# Patient Record
Sex: FEMALE | Race: BLACK OR AFRICAN AMERICAN | NOT HISPANIC OR LATINO | URBAN - METROPOLITAN AREA
[De-identification: names, ages, dates, MRNs, and addresses within clinical notes are randomized per-mention and may not be internally consistent; named-entity substitution may affect disease eponyms.]

---

## 2021-06-10 ENCOUNTER — EMERGENCY (EMERGENCY)
Facility: HOSPITAL | Age: 61
LOS: 0 days | Discharge: HOME | End: 2021-06-11
Attending: EMERGENCY MEDICINE | Admitting: EMERGENCY MEDICINE
Payer: COMMERCIAL

## 2021-06-10 VITALS
WEIGHT: 139.99 LBS | OXYGEN SATURATION: 99 % | SYSTOLIC BLOOD PRESSURE: 257 MMHG | DIASTOLIC BLOOD PRESSURE: 120 MMHG | HEART RATE: 113 BPM | RESPIRATION RATE: 17 BRPM | TEMPERATURE: 99 F

## 2021-06-10 DIAGNOSIS — Z79.82 LONG TERM (CURRENT) USE OF ASPIRIN: ICD-10-CM

## 2021-06-10 DIAGNOSIS — R07.89 OTHER CHEST PAIN: ICD-10-CM

## 2021-06-10 DIAGNOSIS — I10 ESSENTIAL (PRIMARY) HYPERTENSION: ICD-10-CM

## 2021-06-10 DIAGNOSIS — R07.9 CHEST PAIN, UNSPECIFIED: ICD-10-CM

## 2021-06-10 LAB
ALBUMIN SERPL ELPH-MCNC: 5.2 G/DL — SIGNIFICANT CHANGE UP (ref 3.5–5.2)
ALP SERPL-CCNC: 104 U/L — SIGNIFICANT CHANGE UP (ref 30–115)
ALT FLD-CCNC: 20 U/L — SIGNIFICANT CHANGE UP (ref 0–41)
ANION GAP SERPL CALC-SCNC: 10 MMOL/L — SIGNIFICANT CHANGE UP (ref 7–14)
AST SERPL-CCNC: 26 U/L — SIGNIFICANT CHANGE UP (ref 0–41)
BASOPHILS # BLD AUTO: 0.04 K/UL — SIGNIFICANT CHANGE UP (ref 0–0.2)
BASOPHILS NFR BLD AUTO: 0.6 % — SIGNIFICANT CHANGE UP (ref 0–1)
BILIRUB SERPL-MCNC: 0.5 MG/DL — SIGNIFICANT CHANGE UP (ref 0.2–1.2)
BUN SERPL-MCNC: 8 MG/DL — LOW (ref 10–20)
CALCIUM SERPL-MCNC: 10 MG/DL — SIGNIFICANT CHANGE UP (ref 8.5–10.1)
CHLORIDE SERPL-SCNC: 100 MMOL/L — SIGNIFICANT CHANGE UP (ref 98–110)
CO2 SERPL-SCNC: 29 MMOL/L — SIGNIFICANT CHANGE UP (ref 17–32)
CREAT SERPL-MCNC: 0.8 MG/DL — SIGNIFICANT CHANGE UP (ref 0.7–1.5)
EOSINOPHIL # BLD AUTO: 0.11 K/UL — SIGNIFICANT CHANGE UP (ref 0–0.7)
EOSINOPHIL NFR BLD AUTO: 1.6 % — SIGNIFICANT CHANGE UP (ref 0–8)
GLUCOSE SERPL-MCNC: 88 MG/DL — SIGNIFICANT CHANGE UP (ref 70–99)
HCT VFR BLD CALC: 39.9 % — SIGNIFICANT CHANGE UP (ref 37–47)
HGB BLD-MCNC: 12.6 G/DL — SIGNIFICANT CHANGE UP (ref 12–16)
IMM GRANULOCYTES NFR BLD AUTO: 0.3 % — SIGNIFICANT CHANGE UP (ref 0.1–0.3)
LYMPHOCYTES # BLD AUTO: 1.71 K/UL — SIGNIFICANT CHANGE UP (ref 1.2–3.4)
LYMPHOCYTES # BLD AUTO: 25.3 % — SIGNIFICANT CHANGE UP (ref 20.5–51.1)
MCHC RBC-ENTMCNC: 26.8 PG — LOW (ref 27–31)
MCHC RBC-ENTMCNC: 31.6 G/DL — LOW (ref 32–37)
MCV RBC AUTO: 84.9 FL — SIGNIFICANT CHANGE UP (ref 81–99)
MONOCYTES # BLD AUTO: 0.57 K/UL — SIGNIFICANT CHANGE UP (ref 0.1–0.6)
MONOCYTES NFR BLD AUTO: 8.4 % — SIGNIFICANT CHANGE UP (ref 1.7–9.3)
NEUTROPHILS # BLD AUTO: 4.31 K/UL — SIGNIFICANT CHANGE UP (ref 1.4–6.5)
NEUTROPHILS NFR BLD AUTO: 63.8 % — SIGNIFICANT CHANGE UP (ref 42.2–75.2)
NRBC # BLD: 0 /100 WBCS — SIGNIFICANT CHANGE UP (ref 0–0)
PLATELET # BLD AUTO: 321 K/UL — SIGNIFICANT CHANGE UP (ref 130–400)
POTASSIUM SERPL-MCNC: 4 MMOL/L — SIGNIFICANT CHANGE UP (ref 3.5–5)
POTASSIUM SERPL-SCNC: 4 MMOL/L — SIGNIFICANT CHANGE UP (ref 3.5–5)
PROT SERPL-MCNC: 8.2 G/DL — HIGH (ref 6–8)
RBC # BLD: 4.7 M/UL — SIGNIFICANT CHANGE UP (ref 4.2–5.4)
RBC # FLD: 12.1 % — SIGNIFICANT CHANGE UP (ref 11.5–14.5)
SARS-COV-2 RNA SPEC QL NAA+PROBE: SIGNIFICANT CHANGE UP
SODIUM SERPL-SCNC: 139 MMOL/L — SIGNIFICANT CHANGE UP (ref 135–146)
TROPONIN T SERPL-MCNC: <0.01 NG/ML — SIGNIFICANT CHANGE UP
TROPONIN T SERPL-MCNC: <0.01 NG/ML — SIGNIFICANT CHANGE UP
WBC # BLD: 6.76 K/UL — SIGNIFICANT CHANGE UP (ref 4.8–10.8)
WBC # FLD AUTO: 6.76 K/UL — SIGNIFICANT CHANGE UP (ref 4.8–10.8)

## 2021-06-10 PROCEDURE — 71045 X-RAY EXAM CHEST 1 VIEW: CPT | Mod: 26

## 2021-06-10 PROCEDURE — 93010 ELECTROCARDIOGRAM REPORT: CPT | Mod: 77

## 2021-06-10 PROCEDURE — 93010 ELECTROCARDIOGRAM REPORT: CPT

## 2021-06-10 PROCEDURE — 99220: CPT

## 2021-06-10 RX ORDER — LABETALOL HCL 100 MG
20 TABLET ORAL ONCE
Refills: 0 | Status: COMPLETED | OUTPATIENT
Start: 2021-06-10 | End: 2021-06-10

## 2021-06-10 RX ORDER — METOPROLOL TARTRATE 50 MG
100 TABLET ORAL ONCE
Refills: 0 | Status: COMPLETED | OUTPATIENT
Start: 2021-06-10 | End: 2021-06-10

## 2021-06-10 RX ORDER — LABETALOL HCL 100 MG
10 TABLET ORAL ONCE
Refills: 0 | Status: COMPLETED | OUTPATIENT
Start: 2021-06-10 | End: 2021-06-10

## 2021-06-10 RX ORDER — ASPIRIN/CALCIUM CARB/MAGNESIUM 324 MG
324 TABLET ORAL DAILY
Refills: 0 | Status: DISCONTINUED | OUTPATIENT
Start: 2021-06-10 | End: 2021-06-11

## 2021-06-10 RX ADMIN — Medication 100 MILLIGRAM(S): at 17:40

## 2021-06-10 RX ADMIN — Medication 324 MILLIGRAM(S): at 16:02

## 2021-06-10 RX ADMIN — Medication 10 MILLIGRAM(S): at 18:45

## 2021-06-10 RX ADMIN — Medication 20 MILLIGRAM(S): at 20:00

## 2021-06-10 NOTE — ED CDU PROVIDER INITIAL DAY NOTE - ATTENDING CONTRIBUTION TO CARE
59yo woman no significant PMH, but does not routinely follow with a physician, was placed in CDU for workup of chest discomfort without associated sx. In the ED she was hypertensive but asymptomatic; workup was unremarkable and she had some improvement in BP after meds. Exam as noted, pt very well appearing and comfortable on my eval. Plan is for BP control, telemetry, serial EKG/enzymes, CCTA.

## 2021-06-10 NOTE — ED CDU PROVIDER INITIAL DAY NOTE - PROGRESS NOTE DETAILS
received singout from Sukhwinder Dickerson - pt in obs for evaluation of chest pain; - initial ekg/ce wnl; pt with uncontrolled bp; was on bp meds years ago but has not seen Md in 10 yrs and not taken any bp meds; pt currently assymptomatic; - repeat bp still elevated - pt given labetalol 20mg Iv - will repeat bp and continue to monitor; 18 guage iv inserted with US and repeat trop sent;

## 2021-06-10 NOTE — ED PROVIDER NOTE - OBJECTIVE STATEMENT
60 y.o. female without any PMH presented to the ER c/o constant, non-radiating, worsening midsternal chest pressure which started 1PM yesterday after a meal and persisted until she went to sleep last night.  Pt woke up pain free.  Pt denies fever, chills, cough, SOB, nausea, vomiting, diaphoresis, palpitations, recent travel, smoking, drug use, family h/o early heart disease/sudden cardiac death.  (+) weakness last night.  Asymptomatic at present.  Pt hasn't seen a DrMilton in 10 years.

## 2021-06-10 NOTE — ED CDU PROVIDER INITIAL DAY NOTE - OBJECTIVE STATEMENT
61 y/o F, no significant PMHx, presents to the ED with complaints of chest discomfort yesterday. Patient admits to a pressure-like sensation that lasted several hours yesterday and resolved last night. She denies accompanying dyspnea, back pain, abdominal pain, nausea, vomiting, diaphoresis, lower extremity swelling and any recent travel/immobilization. She states that she has not seen a physician in 10+ years. She states that she was previously on an anti-hypertensive medication at that time. She is not a smoker; denies FHx of CAD.

## 2021-06-10 NOTE — ED PROVIDER NOTE - CLINICAL SUMMARY MEDICAL DECISION MAKING FREE TEXT BOX
EKG nonischemic, CXR and labs ok. BP still elevated; will place in CDU for continued workup and management.

## 2021-06-10 NOTE — ED ADULT TRIAGE NOTE - CHIEF COMPLAINT QUOTE
chest pain x 3 days with no radiating pain, no use of pain medications, hypertensive at triage, used to take BP meds but does not take it no more

## 2021-06-10 NOTE — ED CDU PROVIDER INITIAL DAY NOTE - MEDICAL DECISION MAKING DETAILS
61yo woman no significant PMH, but does not routinely follow with a physician, was placed in CDU for workup of chest discomfort without associated sx. In the ED she was hypertensive but asymptomatic; workup was unremarkable and she had some improvement in BP after meds. Exam as noted, pt very well appearing and comfortable on my eval. Plan is for BP control, telemetry, serial EKG/enzymes, CCTA.

## 2021-06-10 NOTE — ED PROVIDER NOTE - ATTENDING CONTRIBUTION TO CARE
61yo woman no significant PMH c/o chest pain, pressurelike, constant x 2 days. Began after eating. No associated SOB, sweating, nausea, no exertional component. On exam pt is very hypertensive (she says she has not seen a physician in 10 years) but asymptomatic, nontoxic appearing. Lungs CTA, CVS1S2 RRR abd soft, no peripheral edema. Plan is for EKG, labs, CXR, BP control and likely obs for cardiac workup.

## 2021-06-11 VITALS
HEART RATE: 68 BPM | SYSTOLIC BLOOD PRESSURE: 187 MMHG | RESPIRATION RATE: 18 BRPM | TEMPERATURE: 97 F | DIASTOLIC BLOOD PRESSURE: 84 MMHG | OXYGEN SATURATION: 100 %

## 2021-06-11 PROCEDURE — 75574 CT ANGIO HRT W/3D IMAGE: CPT | Mod: 26,MA

## 2021-06-11 PROCEDURE — 99217: CPT

## 2021-06-11 RX ORDER — METOPROLOL TARTRATE 50 MG
100 TABLET ORAL ONCE
Refills: 0 | Status: COMPLETED | OUTPATIENT
Start: 2021-06-11 | End: 2021-06-11

## 2021-06-11 RX ORDER — LOSARTAN/HYDROCHLOROTHIAZIDE 100MG-25MG
1 TABLET ORAL
Qty: 30 | Refills: 0
Start: 2021-06-11 | End: 2021-07-10

## 2021-06-11 RX ORDER — AMLODIPINE BESYLATE 2.5 MG/1
10 TABLET ORAL ONCE
Refills: 0 | Status: COMPLETED | OUTPATIENT
Start: 2021-06-11 | End: 2021-06-11

## 2021-06-11 RX ORDER — LOSARTAN POTASSIUM 100 MG/1
100 TABLET, FILM COATED ORAL ONCE
Refills: 0 | Status: COMPLETED | OUTPATIENT
Start: 2021-06-11 | End: 2021-06-11

## 2021-06-11 RX ORDER — ASPIRIN/CALCIUM CARB/MAGNESIUM 324 MG
1 TABLET ORAL
Qty: 30 | Refills: 0
Start: 2021-06-11 | End: 2021-07-10

## 2021-06-11 RX ORDER — AMLODIPINE BESYLATE 2.5 MG/1
1 TABLET ORAL
Qty: 30 | Refills: 0
Start: 2021-06-11 | End: 2021-07-10

## 2021-06-11 RX ORDER — ATORVASTATIN CALCIUM 80 MG/1
1 TABLET, FILM COATED ORAL
Qty: 30 | Refills: 0
Start: 2021-06-11 | End: 2021-07-10

## 2021-06-11 RX ORDER — HYDROCHLOROTHIAZIDE 25 MG
50 TABLET ORAL ONCE
Refills: 0 | Status: COMPLETED | OUTPATIENT
Start: 2021-06-11 | End: 2021-06-11

## 2021-06-11 RX ADMIN — Medication 100 MILLIGRAM(S): at 06:48

## 2021-06-11 RX ADMIN — Medication 100 MILLIGRAM(S): at 08:12

## 2021-06-11 RX ADMIN — Medication 50 MILLIGRAM(S): at 08:13

## 2021-06-11 RX ADMIN — Medication 100 MILLIGRAM(S): at 00:09

## 2021-06-11 RX ADMIN — AMLODIPINE BESYLATE 10 MILLIGRAM(S): 2.5 TABLET ORAL at 02:42

## 2021-06-11 RX ADMIN — LOSARTAN POTASSIUM 100 MILLIGRAM(S): 100 TABLET, FILM COATED ORAL at 08:13

## 2021-06-11 NOTE — ED CDU PROVIDER DISPOSITION NOTE - CARE PROVIDER_API CALL
[facility] PHYSICIAN [facility],   [address] [facility] [address]  Phone: [phone]  Fax: (   )    -  Follow Up Time:     Josh Soteol)  Cardiovascular Disease; Internal Medicine  [address] [place] [address], [place] [address]  Phone: [phone]  Fax: [phone]  Follow Up Time: Routine

## 2021-06-11 NOTE — ED CDU PROVIDER SUBSEQUENT DAY NOTE - HISTORY
pt resting in obs currently assymptomatic; bp improved initially with labatalol 20mg IV no up again; pt given toprol 100mg XL; will continue to monitor;

## 2021-06-11 NOTE — ED CDU PROVIDER DISPOSITION NOTE - PROVIDER TOKENS
FREE:[LAST:[[facility]],PHONE:[phone]],FAX:[phone]   [phone]    -],ADDRESS:[[address] [place] [address]]],PROVIDER:[[id number]],FOLLOWUP:[Routine]]

## 2021-06-11 NOTE — ED ADULT NURSE REASSESSMENT NOTE - NS ED NURSE REASSESS COMMENT FT1
received pt from RN, VSS except /113, MD aware pt given BP medications, no complaints of headache or dizziness, VS rechecked 1000 and BP= 175/83, no complaints offered by patient, Will continue to monitor

## 2021-06-11 NOTE — ED CDU PROVIDER DISPOSITION NOTE - ATTENDING CONTRIBUTION TO CARE
59yo woman no significant PMH, but does not routinely follow with a physician, was placed in CDU for workup of chest discomfort without associated sx. In the ED she was hypertensive but asymptomatic; workup was unremarkable and she had some improvement in BP after meds. Exam as noted, pt very well appearing and comfortable on my eval. Still hypertensive. Repeat EKG/enzymes unchanged. CCTA was CAD-RAD 2; Pt was started on asa and atorvastatin as well as losartan/HCTZ and amlodipine. She will keep a log of her BP's and return to the ED for recurrent sx. She understands the importance of f/u with cardiology within the next 1-2 weeks.

## 2021-06-11 NOTE — ED CDU PROVIDER DISPOSITION NOTE - NSFOLLOWUPINSTRUCTIONS_ED_ALL_ED_FT
Nonspecific Chest Pain  Chest pain can be caused by many different conditions. There is always a chance that your pain could be related to something serious, such as a heart attack or a blood clot in your lungs. Chest pain can also be caused by conditions that are not life-threatening. If you have chest pain, it is very important to follow up with your health care provider.    What are the causes?  Causes of this condition include:    Heartburn.  Pneumonia or bronchitis.  Anxiety or stress.  Inflammation around your heart (pericarditis) or lung (pleuritis or pleurisy).  A blood clot in your lung.  A collapsed lung (pneumothorax). This can develop suddenly on its own (spontaneous pneumothorax) or from trauma to the chest.  Shingles infection (varicella-zoster virus).  Heart attack.  Damage to the bones, muscles, and cartilage that make up your chest wall. This can include:    Bruised bones due to injury.  Strained muscles or cartilage due to frequent or repeated coughing or overwork.  Fracture to one or more ribs.  Sore cartilage due to inflammation (costochondritis).      What increases the risk?  Risk factors for this condition may include:    Activities that increase your risk for trauma or injury to your chest.  Respiratory infections or conditions that cause frequent coughing.  Medical conditions or overeating that can cause heartburn.  Heart disease or family history of heart disease.  Conditions or health behaviors that increase your risk of developing a blood clot.  Having had chicken pox (varicella zoster).    What are the signs or symptoms?  Chest pain can feel like:    Burning or tingling on the surface of your chest or deep in your chest.  Crushing, pressure, aching, or squeezing pain.  Dull or sharp pain that is worse when you move, cough, or take a deep breath.  Pain that is also felt in your back, neck, shoulder, or arm, or pain that spreads to any of these areas.    Your chest pain may come and go, or it may stay constant.    How is this diagnosed?  Lab tests or other studies may be needed to find the cause of your pain. Your health care provider may have you take a test called an ECG (electrocardiogram). An ECG records your heartbeat patterns at the time the test is performed. You may also have other tests, such as:    Transthoracic echocardiogram (TTE). In this test, sound waves are used to create a picture of the heart structures and to look at how blood flows through your heart.  Transesophageal echocardiogram (BRII). This is a more advanced imaging test that takes images from inside your body. It allows your health care provider to see your heart in finer detail.  Cardiac monitoring. This allows your health care provider to monitor your heart rate and rhythm in real time.  Holter monitor. This is a portable device that records your heartbeat and can help to diagnose abnormal heartbeats. It allows your health care provider to track your heart activity for several days, if needed.  Stress tests. These can be done through exercise or by taking medicine that makes your heart beat more quickly.  Blood tests.  Other imaging tests.    How is this treated?  Treatment depends on what is causing your chest pain. Treatment may include:    Medicines. These may include:    Acid blockers for heartburn.  Anti-inflammatory medicine.  Pain medicine for inflammatory conditions.  Antibiotic medicine, if an infection is present.  Medicines to dissolve blood clots.  Medicines to treat coronary artery disease (CAD).    Supportive care for conditions that do not require medicines. This may include:    Resting.  Applying heat or cold packs to injured areas.  Limiting activities until pain decreases.      Follow these instructions at home:  Medicines     If you were prescribed an antibiotic, take it as told by your health care provider. Do not stop taking the antibiotic even if you start to feel better.  Take over-the-counter and prescription medicines only as told by your health care provider.  Lifestyle     Do not use any products that contain nicotine or tobacco, such as cigarettes and e-cigarettes. If you need help quitting, ask your health care provider.  Do not drink alcohol.  ImageMake lifestyle changes as directed by your health care provider. These may include:    Getting regular exercise. Ask your health care provider to suggest some activities that are safe for you.  Eating a heart-healthy diet. A registered dietitian can help you to learn healthy eating options.  Maintaining a healthy weight.  Managing diabetes, if necessary.  Reducing stress, such as with yoga or relaxation techniques.    General instructions     Avoid any activities that bring on chest pain.  If heartburn is the cause for your chest pain, raise (elevate) the head of your bed about 6 inches (15 cm) by putting blocks under the legs. Sleeping with more pillows does not effectively relieve heartburn because it only changes the position of your head.  Keep all follow-up visits as told by your health care provider. This is important. This includes any further testing if your chest pain does not go away.  Contact a health care provider if:  Your chest pain does not go away.  You have a rash with blisters on your chest.  You have a fever.  You have chills.  Get help right away if:  Your chest pain is worse.  You have a cough that gets worse, or you cough up blood.  You have severe pain in your abdomen.  You have severe weakness.  You faint.  You have sudden, unexplained chest discomfort.  You have sudden, unexplained discomfort in your arms, back, neck, or jaw.  You have shortness of breath at any time.  You suddenly start to sweat, or your skin gets clammy.  You feel nauseous or you vomit.  You suddenly feel light-headed or dizzy.  Your heart begins to beat quickly, or it feels like it is skipping beats.  These symptoms may represent a serious problem that is an emergency. Do not wait to see if the symptoms will go away. Get medical help right away. Call your local emergency services (911 in the [place] Do not drive yourself to the hospital.     This information is not intended to replace advice given to you by your health care provider. Make sure you discuss any questions you have with your health care provider.    Hypertension  Hypertension, commonly called high blood pressure, is when the force of blood pumping through the arteries is too strong. The arteries are the blood vessels that carry blood from the heart throughout the body. Hypertension forces the heart to work harder to pump blood and may cause arteries to become narrow or stiff. Having untreated or uncontrolled hypertension can cause heart attacks, strokes, kidney disease, and other problems.    A blood pressure reading consists of a higher number over a lower number. Ideally, your blood pressure should be below 120/80. The first ("top") number is called the systolic pressure. It is a measure of the pressure in your arteries as your heart beats. The second ("bottom") number is called the diastolic pressure. It is a measure of the pressure in your arteries as the heart relaxes.    What are the causes?  The cause of this condition is not known.    What increases the risk?  Some risk factors for high blood pressure are under your control. Others are not.    Factors you can change     Smoking.  Having type 2 diabetes mellitus, high cholesterol, or both.  Not getting enough exercise or physical activity.  Being overweight.  Having too much fat, sugar, calories, or salt (sodium) in your diet.  Drinking too much alcohol.  Factors that are difficult or impossible to change     Having chronic kidney disease.  Having a family history of high blood pressure.  Age. Risk increases with age.  Race. You may be at higher risk if you are -American.  Gender. Men are at higher risk than women before age 45. After age 65, women are at higher risk than men.  Having obstructive sleep apnea.  Stress.  What are the signs or symptoms?  Extremely high blood pressure (hypertensive crisis) may cause:    Headache.  Anxiety.  Shortness of breath.  Nosebleed.  Nausea and vomiting.  Severe chest pain.  Jerky movements you cannot control (seizures).    How is this diagnosed?  This condition is diagnosed by measuring your blood pressure while you are seated, with your arm resting on a surface. The cuff of the blood pressure monitor will be placed directly against the skin of your upper arm at the level of your heart. It should be measured at least twice using the same arm. Certain conditions can cause a difference in blood pressure between your right and left arms.    Certain factors can cause blood pressure readings to be lower or higher than normal (elevated) for a short period of time:    When your blood pressure is higher when you are in a health care provider's office than when you are at home, this is called white coat hypertension. Most people with this condition do not need medicines.  When your blood pressure is higher at home than when you are in a health care provider's office, this is called masked hypertension. Most people with this condition may need medicines to control blood pressure.    If you have a high blood pressure reading during one visit or you have normal blood pressure with other risk factors:    You may be asked to return on a different day to have your blood pressure checked again.  You may be asked to monitor your blood pressure at home for 1 week or longer.    If you are diagnosed with hypertension, you may have other blood or imaging tests to help your health care provider understand your overall risk for other conditions.    How is this treated?  This condition is treated by making healthy lifestyle changes, such as eating healthy foods, exercising more, and reducing your alcohol intake. Your health care provider may prescribe medicine if lifestyle changes are not enough to get your blood pressure under control, and if:    Your systolic blood pressure is above 130.  Your diastolic blood pressure is above 80.    Your personal target blood pressure may vary depending on your medical conditions, your age, and other factors.    Follow these instructions at home:  Eating and drinking     Eat a diet that is high in fiber and potassium, and low in sodium, added sugar, and fat. An example eating plan is called the DASH (Dietary Approaches to Stop Hypertension) diet. To eat this way:    Eat plenty of fresh fruits and vegetables. Try to fill half of your plate at each meal with fruits and vegetables.  Eat whole grains, such as whole wheat pasta, brown rice, or whole grain bread. Fill about one quarter of your plate with whole grains.  Eat or drink low-fat dairy products, such as skim milk or low-fat yogurt.  Avoid fatty cuts of meat, processed or cured meats, and poultry with skin. Fill about one quarter of your plate with lean proteins, such as fish, chicken without skin, beans, eggs, and tofu.  Avoid premade and processed foods. These tend to be higher in sodium, added sugar, and fat.    Reduce your daily sodium intake. Most people with hypertension should eat less than 1,500 mg of sodium a day.  ImageLimit alcohol intake to no more than 1 drink a day for nonpregnant women and 2 drinks a day for men. One drink equals 12 oz of beer, 5 oz of wine, or 1½ oz of hard liquor.  Lifestyle     Work with your health care provider to maintain a healthy body weight or to lose weight. Ask what an ideal weight is for you.  Get at least 30 minutes of exercise that causes your heart to beat faster (aerobic exercise) most days of the week. Activities may include walking, swimming, or biking.  Include exercise to strengthen your muscles (resistance exercise), such as pilates or lifting weights, as part of your weekly exercise routine. Try to do these types of exercises for 30 minutes at least 3 days a week.  Do not use any products that contain nicotine or tobacco, such as cigarettes and e-cigarettes. If you need help quitting, ask your health care provider.  Monitor your blood pressure at home as told by your health care provider.  Keep all follow-up visits as told by your health care provider. This is important.  Medicines     Take over-the-counter and prescription medicines only as told by your health care provider. Follow directions carefully. Blood pressure medicines must be taken as prescribed.  Do not skip doses of blood pressure medicine. Doing this puts you at risk for problems and can make the medicine less effective.  Ask your health care provider about side effects or reactions to medicines that you should watch for.  Contact a health care provider if:  You think you are having a reaction to a medicine you are taking.  You have headaches that keep coming back (recurring).  You feel dizzy.  You have swelling in your ankles.  You have trouble with your vision.  Get help right away if:  You develop a severe headache or confusion.  You have unusual weakness or numbness.  You feel faint.  You have severe pain in your chest or abdomen.  You vomit repeatedly.  You have trouble breathing.  Summary  Hypertension is when the force of blood pumping through your arteries is too strong. If this condition is not controlled, it may put you at risk for serious complications.  Your personal target blood pressure may vary depending on your medical conditions, your age, and other factors. For most people, a normal blood pressure is less than 120/80.  Hypertension is treated with lifestyle changes, medicines, or a combination of both. Lifestyle changes include weight loss, eating a healthy, low-sodium diet, exercising more, and limiting alcohol.  This information is not intended to replace advice given to you by your health care provider. Make sure you discuss any questions you have with your health care provider.

## 2021-06-11 NOTE — ED CDU PROVIDER SUBSEQUENT DAY NOTE - ATTENDING CONTRIBUTION TO CARE
61yo woman no significant PMH, but does not routinely follow with a physician, was placed in CDU for workup of chest discomfort without associated sx. In the ED she was hypertensive but asymptomatic; workup was unremarkable and she had some improvement in BP after meds. Exam as noted, pt very well appearing and comfortable on my eval. Still hypertensive. Repeat EKG/enzymes unchanged. Plan is for BP control, CCTA.

## 2021-06-11 NOTE — ED CDU PROVIDER DISPOSITION NOTE - CLINICAL COURSE
61yo woman no significant PMH, but does not routinely follow with a physician, was placed in CDU for workup of chest discomfort without associated sx. In the ED she was hypertensive but asymptomatic; workup was unremarkable and she had some improvement in BP after meds. Exam as noted, pt very well appearing and comfortable on my eval. Still hypertensive. Repeat EKG/enzymes unchanged. CCTA was CAD-RAD 2; Pt was started on asa and atorvastatin as well as losartan/HCTZ and amlodipine. She will keep a log of her BP's and return to the ED for recurrent sx. She understands the importance of f/u with cardiology within the next 1-2 weeks.

## 2021-06-11 NOTE — ED CDU PROVIDER SUBSEQUENT DAY NOTE - PROGRESS NOTE DETAILS
Patient feeling well this AM; HR currently mid - 60's - will administer additional dose of lopressor. BP still elevated ; will order additional PO medication. Patient remains asymptomatic.

## 2021-06-11 NOTE — ED CDU PROVIDER DISPOSITION NOTE - PATIENT PORTAL LINK FT
You can access the FollowMyHealth Patient Portal offered by [facility] by registering at the following website: [email or web address]. By joining [employer]’s FollowMyHealth portal, you will also be able to view your health information using other applications (apps) compatible with our system.

## 2021-06-11 NOTE — ED CDU PROVIDER SUBSEQUENT DAY NOTE - MEDICAL DECISION MAKING DETAILS
59yo woman no significant PMH, but does not routinely follow with a physician, was placed in CDU for workup of chest discomfort without associated sx. In the ED she was hypertensive but asymptomatic; workup was unremarkable and she had some improvement in BP after meds. Exam as noted, pt very well appearing and comfortable on my eval. Still hypertensive. Repeat EKG/enzymes unchanged. Plan is for BP control, CCTA.

## 2021-06-16 PROBLEM — I10 ESSENTIAL (PRIMARY) HYPERTENSION: Chronic | Status: ACTIVE | Noted: 2021-06-10

## 2021-07-12 ENCOUNTER — APPOINTMENT (OUTPATIENT)
Dept: CARDIOLOGY | Facility: CLINIC | Age: 61
End: 2021-07-12
Payer: COMMERCIAL

## 2021-07-12 VITALS
DIASTOLIC BLOOD PRESSURE: 110 MMHG | WEIGHT: 146 LBS | TEMPERATURE: 96.4 F | HEIGHT: 65.5 IN | SYSTOLIC BLOOD PRESSURE: 170 MMHG | BODY MASS INDEX: 24.03 KG/M2

## 2021-07-12 PROCEDURE — 93000 ELECTROCARDIOGRAM COMPLETE: CPT

## 2021-07-12 PROCEDURE — 99204 OFFICE O/P NEW MOD 45 MIN: CPT

## 2021-07-12 RX ORDER — ASPIRIN 81 MG/1
81 TABLET, COATED ORAL
Qty: 30 | Refills: 0 | Status: ACTIVE | COMMUNITY
Start: 2021-06-11

## 2021-07-16 ENCOUNTER — APPOINTMENT (OUTPATIENT)
Dept: CARDIOLOGY | Facility: CLINIC | Age: 61
End: 2021-07-16

## 2021-07-25 NOTE — DISCUSSION/SUMMARY
[FreeTextEntry1] : Cont ASA\par Cont Lipitor\par Cont Norvasc\par Stop Losartan-HCTZ (not taking)\par Start Valsartan.\par Labs.\par ECHO.\par Monitor BP.\par PMD referral.\par Follow-up 3-months.

## 2021-07-25 NOTE — PHYSICAL EXAM
[de-identified] : well appearing, mildly overweight, no distress [de-identified] : no xanthelasma [de-identified] : reg. nL s1/s2, no m/r/g [de-identified] : CTA [de-identified] : no edema [de-identified] : alert, normnal affect, logical conversation

## 2021-07-25 NOTE — HISTORY OF PRESENT ILLNESS
[FreeTextEntry1] : ER last month.  Presents for follow-up.\par \par No cardiac history.\par Risk factors include HTN (untreated).\par \par Atypical prolonged CP after meal.  Likely GERD / dyspepsia.  Resolved.  Ruled-out MI.  Mild CAD on CCTA.  Hypertensive / started on Rx.\par \par Rx initiated.  Not taking Losartan-HCTZ.  Reports prior HCTZ intolerance.\par \par Feels well since discharge.\par \par No chest pain.  Breathing comfortable.  No palpitations, lightheadedness, syncope.\par \par CCTA (6/11/21): Mild mid LAD / CX disease.  Ca = (5).

## 2021-10-06 ENCOUNTER — APPOINTMENT (OUTPATIENT)
Dept: CARDIOLOGY | Facility: CLINIC | Age: 61
End: 2021-10-06

## 2021-12-06 ENCOUNTER — APPOINTMENT (OUTPATIENT)
Dept: CARDIOLOGY | Facility: CLINIC | Age: 61
End: 2021-12-06

## 2022-02-06 ENCOUNTER — EMERGENCY (EMERGENCY)
Facility: HOSPITAL | Age: 62
LOS: 0 days | Discharge: HOME | End: 2022-02-06
Attending: EMERGENCY MEDICINE | Admitting: EMERGENCY MEDICINE
Payer: COMMERCIAL

## 2022-02-06 VITALS
SYSTOLIC BLOOD PRESSURE: 222 MMHG | RESPIRATION RATE: 7 BRPM | WEIGHT: 130.07 LBS | HEIGHT: 65 IN | OXYGEN SATURATION: 99 % | DIASTOLIC BLOOD PRESSURE: 127 MMHG

## 2022-02-06 VITALS
OXYGEN SATURATION: 99 % | DIASTOLIC BLOOD PRESSURE: 69 MMHG | SYSTOLIC BLOOD PRESSURE: 212 MMHG | RESPIRATION RATE: 18 BRPM | HEART RATE: 94 BPM

## 2022-02-06 DIAGNOSIS — M54.50 LOW BACK PAIN, UNSPECIFIED: ICD-10-CM

## 2022-02-06 DIAGNOSIS — M54.42 LUMBAGO WITH SCIATICA, LEFT SIDE: ICD-10-CM

## 2022-02-06 PROCEDURE — 99284 EMERGENCY DEPT VISIT MOD MDM: CPT

## 2022-02-06 RX ORDER — TIZANIDINE 4 MG/1
2 TABLET ORAL
Qty: 30 | Refills: 0
Start: 2022-02-06 | End: 2022-02-10

## 2022-02-06 RX ORDER — IBUPROFEN 200 MG
600 TABLET ORAL ONCE
Refills: 0 | Status: COMPLETED | OUTPATIENT
Start: 2022-02-06 | End: 2022-02-06

## 2022-02-06 RX ORDER — METHOCARBAMOL 500 MG/1
1000 TABLET, FILM COATED ORAL ONCE
Refills: 0 | Status: COMPLETED | OUTPATIENT
Start: 2022-02-06 | End: 2022-02-06

## 2022-02-06 RX ADMIN — Medication 600 MILLIGRAM(S): at 19:22

## 2022-02-06 RX ADMIN — METHOCARBAMOL 1000 MILLIGRAM(S): 500 TABLET, FILM COATED ORAL at 19:22

## 2022-02-06 NOTE — ED PROVIDER NOTE - PATIENT PORTAL LINK FT
You can access the FollowMyHealth Patient Portal offered by [facility] by registering at the following website: [email or web address]. By joining [employer]’s FollowMyHealth portal, you will also be able to view your health information using other applications (apps) compatible with our system.

## 2022-02-06 NOTE — ED PROVIDER NOTE - OBJECTIVE STATEMENT
61 year old female with no pmhx presents with left lower back and buttock pain x few days. Pain sharp, and burning, shoots down left leg - worse with  movement, has not taken any medication for it. no trauma, paresthesias or weakness, urinary symptoms, abd pain or nausea, vomiting, diarrhea.

## 2022-02-06 NOTE — ED PROVIDER NOTE - NS ED ROS FT
Review of Systems:  	•	CONSTITUTIONAL - no fever, no diaphoresis, no chills  	•	SKIN - no rash  	•	HEMATOLOGIC - no bleeding, no bruising  	•	RESPIRATORY - no shortness of breath, no cough  	•	CARDIAC - no chest pain, no palpitations  	•	GI - no abd pain, no nausea, no vomiting, no diarrhea, no constipation  	•	GENITO-URINARY - no discharge, no dysuria; no hematuria, no increased urinary frequency  	•	MUSCULOSKELETAL - L back pain  	•	NEUROLOGIC - no weakness, no headache, no paresthesias, no LOC

## 2022-02-06 NOTE — ED PROVIDER NOTE - NSFOLLOWUPCLINICS_GEN_ALL_ED_FT
[facility] ([facility])  Rehabilitation  [facility] [address] [place] [address]  Phone: [phone]  Fax:

## 2022-02-06 NOTE — ED PROVIDER NOTE - CLINICAL SUMMARY MEDICAL DECISION MAKING FREE TEXT BOX
60 yo woman with lower back pain for the past few days that has begun to move into her left buttock and shooting down her left leg.  Improved with walking.  Worsens when sitting too long.  no numbness, weakness, or urinary or bowel symptoms.  Patient has been non-complaint with her anti-hypertensives due to the way they have made her feel.  Stopped them on her own.  BP is elevated in ED and would be expected considering her discomfort and non-compliance.  She has an appointment with PMD on Tuesday to reassess her meds.  Stable for discharge at this point.

## 2022-02-06 NOTE — ED ADULT NURSE NOTE - OBJECTIVE STATEMENT
Pt presents to ED c/o pain to L butt x 1 week. Pt denies falling or trauma to area. Pt is awake alert and not in any distress Pt presents to ED c/o pain to L butt x 1 week. Pt denies falling or trauma to area. Pt is awake alert and not in any distress. Pt stated Bp is high because pt has been non compliant with BP meds.

## 2022-02-06 NOTE — ED PROVIDER NOTE - PHYSICAL EXAMINATION
CONST: Well appearing in NAD  EYES: PERRL, EOMI, Sclera and conjunctiva clear.   CARD: Normal S1 S2; Normal rate and rhythm  RESP: Equal BS B/L, No wheezes, rhonchi or rales. No distress  GI: Soft, non-tender, non-distended. no cva tenderness. normal BS  MS: + left sciatic notch tenderness, Normal ROM in all extremities. No midline spinal tenderness. pulses 2 +. no calf tenderness or swelling  SKIN: Warm, dry, no acute rashes. Good turgor  NEURO: Sensation intact and muscle strength 5/5 to lower extremities

## 2022-02-06 NOTE — ED PROVIDER NOTE - NSFOLLOWUPINSTRUCTIONS_ED_ALL_ED_FT
Sciatica    WHAT YOU NEED TO KNOW:    Sciatica is a condition that causes pain along your sciatic nerve. The sciatic nerve runs from your spine through both sides of your buttocks. It then runs down the back of your thigh, into your lower leg and foot. Your sciatic nerve may be compressed, inflamed, irritated, or stretched.          DISCHARGE INSTRUCTIONS:    Medicines:     NSAIDs: These medicines decrease swelling and pain. NSAIDs are available without a doctor's order. Ask your healthcare provider which medicine is right for you. Ask how much to take and when to take it. Take as directed. NSAIDs can cause stomach bleeding or kidney problems if not taken correctly.      Acetaminophen: This medicine decreases pain. Acetaminophen is available without a doctor's order. Ask how much to take and when to take it. Follow directions. Acetaminophen can cause liver damage if not taken correctly.      Muscle relaxers help decrease pain and muscle spasms.      Take your medicine as directed. Contact your healthcare provider if you think your medicine is not helping or if you have side effects. Tell him of her if you are allergic to any medicine. Keep a list of the medicines, vitamins, and herbs you take. Include the amounts, and when and why you take them. Bring the list or the pill bottles to follow-up visits. Carry your medicine list with you in case of an emergency.    Follow up with your healthcare provider as directed: Write down your questions so you remember to ask them during your visits.     Manage your symptoms:     Activity: Decrease your activity. Do not lift heavy objects or twist your back for at least 6 weeks. Slowly return to your usual activity.      Ice: Ice helps decrease swelling and pain. Ice may also help prevent tissue damage. Use an ice pack, or put crushed ice in a plastic bag. Cover it with a towel and place it on your low back or leg for 15 to 20 minutes every hour or as directed.      Heat: Heat helps decrease pain and muscle spasms. Apply heat on the area for 20 to 30 minutes every 2 hours for as many days as directed.       Physical therapy: You may need to see physical therapist to teach you exercises to help improve movement and strength, and to decrease pain. An occupational therapist teaches you skills to help with your daily activities.       Use assistive devices if directed: You may need to wear back support, such as a back brace. You may need crutches, a cane, or a walker to decrease stress on your lower back and leg muscles. Ask your healthcare provider for more information about assistive devices and how to use them correctly.    Self-care:     Avoid pressure on your back and legs: Do not lift heavy objects, or stand or sit for long periods of time.      Lift objects safely: Keep your back straight and bend your knees when you  an object. Do not bend or twist your back when you lift.      Maintain a healthy weight: Ask your healthcare provider how much you should weigh. Ask him to help you create a weight loss plan if you are overweight.       Exercise: Ask your healthcare provider about the best stretching, warmup, and exercise plan for you.     Contact your healthcare provider if:     You have pain in your lower back at night or when resting.      You have pain in your lower back with numbness below the knee.      You have weakness in one leg only.      You have questions or concerns about your condition or care.    Return to the emergency department if:     You have trouble holding back your urine or bowel movements.      You have weakness in both legs.      You have numbness in your groin or buttocks.         © Copyright [employer] 2019 All illustrations and images included in CareNotes are the copyrighted property of [employer] or [employer].

## 2022-02-16 ENCOUNTER — INPATIENT (INPATIENT)
Facility: HOSPITAL | Age: 62
LOS: 3 days | Discharge: HOME | End: 2022-02-20
Attending: STUDENT IN AN ORGANIZED HEALTH CARE EDUCATION/TRAINING PROGRAM | Admitting: STUDENT IN AN ORGANIZED HEALTH CARE EDUCATION/TRAINING PROGRAM
Payer: COMMERCIAL

## 2022-02-16 VITALS
HEIGHT: 65 IN | OXYGEN SATURATION: 100 % | SYSTOLIC BLOOD PRESSURE: 232 MMHG | DIASTOLIC BLOOD PRESSURE: 122 MMHG | RESPIRATION RATE: 20 BRPM | WEIGHT: 128.09 LBS | TEMPERATURE: 99 F | HEART RATE: 105 BPM

## 2022-02-16 LAB
ALBUMIN SERPL ELPH-MCNC: 4.9 G/DL — SIGNIFICANT CHANGE UP (ref 3.5–5.2)
ALP SERPL-CCNC: 104 U/L — SIGNIFICANT CHANGE UP (ref 30–115)
ALT FLD-CCNC: 18 U/L — SIGNIFICANT CHANGE UP (ref 0–41)
ANION GAP SERPL CALC-SCNC: 14 MMOL/L — SIGNIFICANT CHANGE UP (ref 7–14)
AST SERPL-CCNC: 22 U/L — SIGNIFICANT CHANGE UP (ref 0–41)
BASOPHILS # BLD AUTO: 0.03 K/UL — SIGNIFICANT CHANGE UP (ref 0–0.2)
BASOPHILS NFR BLD AUTO: 0.5 % — SIGNIFICANT CHANGE UP (ref 0–1)
BILIRUB SERPL-MCNC: 0.4 MG/DL — SIGNIFICANT CHANGE UP (ref 0.2–1.2)
BUN SERPL-MCNC: 10 MG/DL — SIGNIFICANT CHANGE UP (ref 10–20)
CALCIUM SERPL-MCNC: 9.8 MG/DL — SIGNIFICANT CHANGE UP (ref 8.5–10.1)
CHLORIDE SERPL-SCNC: 103 MMOL/L — SIGNIFICANT CHANGE UP (ref 98–110)
CO2 SERPL-SCNC: 24 MMOL/L — SIGNIFICANT CHANGE UP (ref 17–32)
CREAT SERPL-MCNC: 0.7 MG/DL — SIGNIFICANT CHANGE UP (ref 0.7–1.5)
EOSINOPHIL # BLD AUTO: 0.13 K/UL — SIGNIFICANT CHANGE UP (ref 0–0.7)
EOSINOPHIL NFR BLD AUTO: 2.1 % — SIGNIFICANT CHANGE UP (ref 0–8)
GLUCOSE BLDC GLUCOMTR-MCNC: 106 MG/DL — HIGH (ref 70–99)
GLUCOSE SERPL-MCNC: 125 MG/DL — HIGH (ref 70–99)
HCT VFR BLD CALC: 38.1 % — SIGNIFICANT CHANGE UP (ref 37–47)
HGB BLD-MCNC: 12 G/DL — SIGNIFICANT CHANGE UP (ref 12–16)
IMM GRANULOCYTES NFR BLD AUTO: 0.3 % — SIGNIFICANT CHANGE UP (ref 0.1–0.3)
LYMPHOCYTES # BLD AUTO: 1.28 K/UL — SIGNIFICANT CHANGE UP (ref 1.2–3.4)
LYMPHOCYTES # BLD AUTO: 21 % — SIGNIFICANT CHANGE UP (ref 20.5–51.1)
MAGNESIUM SERPL-MCNC: 2.4 MG/DL — SIGNIFICANT CHANGE UP (ref 1.8–2.4)
MCHC RBC-ENTMCNC: 27.3 PG — SIGNIFICANT CHANGE UP (ref 27–31)
MCHC RBC-ENTMCNC: 31.5 G/DL — LOW (ref 32–37)
MCV RBC AUTO: 86.8 FL — SIGNIFICANT CHANGE UP (ref 81–99)
MONOCYTES # BLD AUTO: 0.4 K/UL — SIGNIFICANT CHANGE UP (ref 0.1–0.6)
MONOCYTES NFR BLD AUTO: 6.6 % — SIGNIFICANT CHANGE UP (ref 1.7–9.3)
NEUTROPHILS # BLD AUTO: 4.23 K/UL — SIGNIFICANT CHANGE UP (ref 1.4–6.5)
NEUTROPHILS NFR BLD AUTO: 69.5 % — SIGNIFICANT CHANGE UP (ref 42.2–75.2)
NRBC # BLD: 0 /100 WBCS — SIGNIFICANT CHANGE UP (ref 0–0)
PLATELET # BLD AUTO: 302 K/UL — SIGNIFICANT CHANGE UP (ref 130–400)
POTASSIUM SERPL-MCNC: 3.6 MMOL/L — SIGNIFICANT CHANGE UP (ref 3.5–5)
POTASSIUM SERPL-SCNC: 3.6 MMOL/L — SIGNIFICANT CHANGE UP (ref 3.5–5)
PROT SERPL-MCNC: 8 G/DL — SIGNIFICANT CHANGE UP (ref 6–8)
RBC # BLD: 4.39 M/UL — SIGNIFICANT CHANGE UP (ref 4.2–5.4)
RBC # FLD: 12.2 % — SIGNIFICANT CHANGE UP (ref 11.5–14.5)
SARS-COV-2 RNA SPEC QL NAA+PROBE: SIGNIFICANT CHANGE UP
SODIUM SERPL-SCNC: 141 MMOL/L — SIGNIFICANT CHANGE UP (ref 135–146)
TROPONIN T SERPL-MCNC: <0.01 NG/ML — SIGNIFICANT CHANGE UP
WBC # BLD: 6.09 K/UL — SIGNIFICANT CHANGE UP (ref 4.8–10.8)
WBC # FLD AUTO: 6.09 K/UL — SIGNIFICANT CHANGE UP (ref 4.8–10.8)

## 2022-02-16 PROCEDURE — 99291 CRITICAL CARE FIRST HOUR: CPT

## 2022-02-16 PROCEDURE — 99285 EMERGENCY DEPT VISIT HI MDM: CPT

## 2022-02-16 PROCEDURE — 71046 X-RAY EXAM CHEST 2 VIEWS: CPT | Mod: 26

## 2022-02-16 PROCEDURE — 93010 ELECTROCARDIOGRAM REPORT: CPT

## 2022-02-16 RX ORDER — ASPIRIN/CALCIUM CARB/MAGNESIUM 324 MG
81 TABLET ORAL DAILY
Refills: 0 | Status: DISCONTINUED | OUTPATIENT
Start: 2022-02-16 | End: 2022-02-20

## 2022-02-16 RX ORDER — VALSARTAN 80 MG/1
160 TABLET ORAL DAILY
Refills: 0 | Status: DISCONTINUED | OUTPATIENT
Start: 2022-02-17 | End: 2022-02-17

## 2022-02-16 RX ORDER — ATORVASTATIN CALCIUM 80 MG/1
40 TABLET, FILM COATED ORAL AT BEDTIME
Refills: 0 | Status: DISCONTINUED | OUTPATIENT
Start: 2022-02-16 | End: 2022-02-20

## 2022-02-16 RX ORDER — INFLUENZA VIRUS VACCINE 15; 15; 15; 15 UG/.5ML; UG/.5ML; UG/.5ML; UG/.5ML
0.5 SUSPENSION INTRAMUSCULAR ONCE
Refills: 0 | Status: COMPLETED | OUTPATIENT
Start: 2022-02-16 | End: 2022-02-16

## 2022-02-16 RX ORDER — AMLODIPINE BESYLATE 2.5 MG/1
10 TABLET ORAL DAILY
Refills: 0 | Status: DISCONTINUED | OUTPATIENT
Start: 2022-02-17 | End: 2022-02-20

## 2022-02-16 RX ORDER — HYDRALAZINE HCL 50 MG
10 TABLET ORAL ONCE
Refills: 0 | Status: COMPLETED | OUTPATIENT
Start: 2022-02-16 | End: 2022-02-16

## 2022-02-16 RX ORDER — ENOXAPARIN SODIUM 100 MG/ML
40 INJECTION SUBCUTANEOUS AT BEDTIME
Refills: 0 | Status: DISCONTINUED | OUTPATIENT
Start: 2022-02-16 | End: 2022-02-16

## 2022-02-16 RX ORDER — LABETALOL HCL 100 MG
20 TABLET ORAL ONCE
Refills: 0 | Status: COMPLETED | OUTPATIENT
Start: 2022-02-16 | End: 2022-02-16

## 2022-02-16 RX ORDER — ASPIRIN/CALCIUM CARB/MAGNESIUM 324 MG
81 TABLET ORAL DAILY
Refills: 0 | Status: DISCONTINUED | OUTPATIENT
Start: 2022-02-16 | End: 2022-02-16

## 2022-02-16 RX ORDER — VALSARTAN 80 MG/1
160 TABLET ORAL ONCE
Refills: 0 | Status: COMPLETED | OUTPATIENT
Start: 2022-02-16 | End: 2022-02-16

## 2022-02-16 RX ORDER — AMLODIPINE BESYLATE 2.5 MG/1
10 TABLET ORAL ONCE
Refills: 0 | Status: COMPLETED | OUTPATIENT
Start: 2022-02-16 | End: 2022-02-16

## 2022-02-16 RX ORDER — LABETALOL HCL 100 MG
10 TABLET ORAL ONCE
Refills: 0 | Status: DISCONTINUED | OUTPATIENT
Start: 2022-02-16 | End: 2022-02-16

## 2022-02-16 RX ORDER — ATORVASTATIN CALCIUM 80 MG/1
20 TABLET, FILM COATED ORAL AT BEDTIME
Refills: 0 | Status: DISCONTINUED | OUTPATIENT
Start: 2022-02-16 | End: 2022-02-16

## 2022-02-16 RX ORDER — HYDRALAZINE HCL 50 MG
10 TABLET ORAL ONCE
Refills: 0 | Status: DISCONTINUED | OUTPATIENT
Start: 2022-02-16 | End: 2022-02-16

## 2022-02-16 RX ADMIN — AMLODIPINE BESYLATE 10 MILLIGRAM(S): 2.5 TABLET ORAL at 17:35

## 2022-02-16 RX ADMIN — ATORVASTATIN CALCIUM 40 MILLIGRAM(S): 80 TABLET, FILM COATED ORAL at 21:45

## 2022-02-16 RX ADMIN — Medication 20 MILLIGRAM(S): at 15:45

## 2022-02-16 RX ADMIN — Medication 10 MILLIGRAM(S): at 19:58

## 2022-02-16 RX ADMIN — VALSARTAN 160 MILLIGRAM(S): 80 TABLET ORAL at 17:35

## 2022-02-16 RX ADMIN — Medication 10 MILLIGRAM(S): at 20:44

## 2022-02-16 NOTE — H&P ADULT - NSHPLABSRESULTS_GEN_ALL_CORE
12.0   6.09  )-----------( 302      ( 16 Feb 2022 13:55 )             38.1       02-16    141  |  103  |  10  ----------------------------<  125<H>  3.6   |  24  |  0.7    Ca    9.8      16 Feb 2022 13:55  Mg     2.4     02-16    TPro  8.0  /  Alb  4.9  /  TBili  0.4  /  DBili  x   /  AST  22  /  ALT  18  /  AlkPhos  104  02-16                      Lactate Trend      CARDIAC MARKERS ( 16 Feb 2022 13:55 )  x     / <0.01 ng/mL / x     / x     / x            CAPILLARY BLOOD GLUCOSE

## 2022-02-16 NOTE — ED PROVIDER NOTE - PROGRESS NOTE DETAILS
Pt s/o to me by Dr. Paige to follow up cardio, reassess and admit.  Pt resting comfortably in NAD. No active complaints, agrees to admission. PO meds given per cardio recommendations. Pt remains asymptomatic. D/W cardio- can admit cardi telemetry vs CCU if BP not improved. D/w fellow- will give hydralazine and reassess. Repeat /110. FF: I have been in contact with dr. ryan in regards to admitting pt under cardio tele. pt bp has not improved after 10mg of iv labetalol, oral vlasartan, and oral amlodipine. pt given 10mg of oral hydralazine and 10mg of hydralazine iv. pt bp now improved. I spoke with dr. ryan who reports pt need further monitoring and only improved due to meds. would like pt to go to the ccu. I spoke with cardio fellow who is aware of admission to ccu. I spoke with ccu resident  FF: I have been in contact with dr. ryan in regards to admitting pt under cardio tele. pt bp has not improved after 10mg of iv labetalol, oral vlasartan, and oral amlodipine. pt given 10mg of oral hydralazine and 10mg of hydralazine iv. pt bp now improved. I spoke with dr. ryan who reports pt need further monitoring and only improved due to meds. would like pt to go to the ccu. I spoke with cardio fellow who is aware of admission to ccu. I spoke with ccu resident dr. valentine

## 2022-02-16 NOTE — H&P ADULT - ATTENDING COMMENTS
Agree with above  HTN emergency related to non compliance with medication at home,   will resume po antihypertensive for now.

## 2022-02-16 NOTE — PATIENT PROFILE ADULT - FALL HARM RISK - HARM RISK INTERVENTIONS

## 2022-02-16 NOTE — ED PROVIDER NOTE - ATTENDING CONTRIBUTION TO CARE
60 yo f with pmh of HTN, noncompliant with her amlodipine and valsartan due to side effects, presents with elevated bp and chest pain.  pt denies headache, focal numbness, weakness, facial droop or slurred speech.  pt admits had some chest tightness and palpitations last night.  pt tried to call dr. zurita without any success.  pt denies any symptoms currently.  exam: nad, ncat, perrl, eomi, mmm, rrr, ctab, abd soft, nt, nd, aox3, cn2-12 normal, 5/5 strength all ext, sensation intact, finger to nose normal, romberg neg, prontator drift neg, gait normal imp: pt with chronic uncontrolled HTN, c/o chest pain.  had cad rads 2 on previous CCTA, will check labs, ekg, cxr, labetalol and cardio consult

## 2022-02-16 NOTE — ED PROVIDER NOTE - PHYSICAL EXAMINATION
Physical Exam    Vital Signs: I have reviewed the initial vital signs.  Constitutional: well-nourished, appears stated age, no acute distress  Eyes: Conjunctiva pink, Sclera clear  Cardiovascular: S1 and S2, regular rate, regular rhythm, well-perfused extremities, radial pulses equal and 2+ b/l.   Respiratory: unlabored respiratory effort, clear to auscultation bilaterally no wheezing, rales and rhonchi. pt is speaking full sentences. no accessory muscle use. no reproducible chest tenderness, or chest wall crepitus.   Gastrointestinal: soft, non-tender, nondistended abdomen, no pulsatile mass, normal bowl sounds, no rebound, no guarding  Musculoskeletal: supple neck, no lower extremity edema, no calf tenderness  Integumentary: warm, dry, no rash  Neurologic: awake, alert, steady gait.   Psychiatric: appropriate mood, appropriate affect

## 2022-02-16 NOTE — H&P ADULT - HISTORY OF PRESENT ILLNESS
This is a 61 year old Female patient with PMHx of HTN who presented to the ED with intermittent midsternal chest discomfort for 1 day prior to admission. Patient was doing well until the day prior to admission when she started experiencing midsternal chest discomfort. Patient described the discomfort as pressure like pain, non radiating, non exertion related, and associated with headache.  Patient reports she has difficulty sleeping due to feeling palpitations at night. Of note, patient has not been checking her bp at home or taking her Valsartan 160 mg for "a while" because according to the patient it makes her feel dizzy/ lightheaded and she feels like fainting when she takes it. The headache was described as a pounding pressure with no radiation and it wasnt associated with nausea, vomiting, lightheadedness or any other symptoms.    In the ED, patient reported that the headache and chest discomfort has resolved despite BP still being very high 240/115 ( at the time of my evaluation )

## 2022-02-16 NOTE — ED PROVIDER NOTE - CLINICAL SUMMARY MEDICAL DECISION MAKING FREE TEXT BOX
pt evaluated for elevated BP with episode midsternal CP which resolved. . Teated for hypertensive urgency, evaluated by cardio in ED. Pt required admission for BP control, d/w cardiology attending Dr. Sheldon, initially accepted to cardio tele but later recommended CCU admission for increased monitoring. Pt agreed to admission. ICU admitting resident notified.

## 2022-02-16 NOTE — ED PROVIDER NOTE - CARE PLAN
1 Principal Discharge DX:	Hypertension  Secondary Diagnosis:	Chest pain  Secondary Diagnosis:	Palpitation   Principal Discharge DX:	Hypertensive urgency  Secondary Diagnosis:	Chest pain  Secondary Diagnosis:	Palpitation

## 2022-02-16 NOTE — H&P ADULT - ASSESSMENT
This is a 61 year old Female patient with PMHx of HTN who presented to the ED with intermittent midsternal chest discomfort for 1 day prior to admission.    #Hypertensive urgency   #Chest discomfort  #Headache   - Chest discomfort likely related to uncontrolled BP.  - No signs of ACS on clinical presentation, troponin negative x1, EKG with no acute ischemic changes   - Repeat EKG and trop if acute change in symptoms.  -Patient received 1 of Labetalol 20 mg IV push, 1 of Hydralazine 10 mg IV push   - Will restart home meds amlodipine and valsartan and monitor for side effect.  - Monitor on tele.  - Check TTE.    #DVT ppx: Lovenox   #GI ppx: Protonix   #Admit to tele   #Acute  This is a 61 year old Female patient with PMHx of HTN who presented to the ED with intermittent midsternal chest discomfort for 1 day prior to admission.    #Hypertensive urgency   #Chest discomfort  #Headache   - Chest discomfort likely related to uncontrolled BP.  - No signs of ACS on clinical presentation, troponin negative x1, EKG with no acute ischemic changes   - Repeat EKG and trop if acute change in symptoms.  -Patient received 1 of Labetalol 20 mg IV push, 1 of Hydralazine 10 mg IV push   - Will restart home meds amlodipine and valsartan and monitor for side effect.  - Monitor on tele.  - Check TTE.    #DVT ppx: Lovenox   #GI ppx: N/A  #Admit to tele   #Acute  IMPRESSION:    Hypertensive urgency   Chest discomfort  Headache     IMPRESSION:    PLAN:    CNS:   -no depressants.     HEENT:   -Oral care    PULMONARY:    -HOB @ 45 degrees    CARDIOVASCULAR:   - No signs of ACS on clinical presentation, troponin negative x1, EKG with no acute ischemic changes   - s/p Labetalol 20 mg IV push, 1 of Hydralazine 10 mg IV push   - c/w amlodipine and valsartan and monitor for side effect.  - Monitor on tele.  - Check TTE    GI:   -GI prophylaxis.    -oral feeds    RENAL:    -Follow up lytes.  Correct as needed.     INFECTIOUS DISEASE:   -CHG 4% daily bath    HEMATOLOGICAL:    -DVT prophylaxis.    ENDOCRINE:    -Follow up FS.  -insulin sliding scale if needed    MUSCULOSKELETAL:   -increase ambulation as tolerated    CCU monitoring   IMPRESSION:    Hypertensive emergency  Chest discomfort  Headache     IMPRESSION:    PLAN:    CNS:   -no depressants.     HEENT:   -Oral care    PULMONARY:    -HOB @ 45 degrees    CARDIOVASCULAR:   - No signs of ACS on clinical presentation, troponin negative x1, EKG with no acute ischemic changes   - s/p Labetalol 20 mg IV push, 1 of Hydralazine 10 mg IV push   - c/w amlodipine and valsartan and monitor for side effect.  - Monitor on tele.  - Check TTE    GI:   -GI prophylaxis.    -oral feeds    RENAL:    -Follow up lytes.  Correct as needed.     INFECTIOUS DISEASE:   -CHG 4% daily bath    HEMATOLOGICAL:    -DVT prophylaxis.    ENDOCRINE:    -Follow up FS.  -insulin sliding scale if needed    MUSCULOSKELETAL:   -increase ambulation as tolerated    CCU monitoring

## 2022-02-16 NOTE — H&P ADULT - NSHPPHYSICALEXAM_GEN_ALL_CORE
CONSTITUTIONAL: Well groomed, no apparent distress  EYES: PERRLA and symmetric, EOMI, No conjunctival or scleral injection  NECK: Supple  RESPIRATORY: Bilateral equal breath sounds, no wheezes, rales or rhonchi  CARDIOVASCULAR: RRRR, +S1S2, no murmurs, no rubs, no JVD; no peripheral edema  GASTROINTESTINAL: Soft, non tender, non distended, no rebound, no guarding  NEUROLOGIC: sensation intact in upper and lower extremities b/l to light touch, motor function WNL in both upper and lower extremities

## 2022-02-16 NOTE — CONSULT NOTE ADULT - ASSESSMENT
60 y/o F hx of HTN non-compliant on meds presents with intermittent midsternal chest discomfort since last night.    Chest discomfort  Hypertensive urgency    - Chest discomfort likely related to uncontrolled BP.  - No signs of ACS on clinical presentation.  - Repeat EKG and trop if acute change in symptoms.  - Will restart home meds amlodipine and valsartan and monitor for side effect.  - Monitor on tele.  - Check TTE.  - Will consider w/u for secondary hypertension if persistent BP despite adequate medication use.  - Will discuss plan with attending cardiologist. 62 y/o F hx of HTN non-compliant on meds presents with intermittent midsternal chest discomfort since last night.    Chest discomfort  Hypertensive urgency    - Chest discomfort likely related to uncontrolled BP.  - No signs of ACS on clinical presentation.  - Repeat EKG and trop if acute change in symptoms.  - Will restart home meds amlodipine and valsartan and monitor for side effect.  - Monitor on tele.  - Check TTE.  - Will consider w/u for secondary hypertension if persistent BP despite adequate medication use.  - Will discuss plan with attending cardiologist.  - upgrade to CCU

## 2022-02-16 NOTE — ED PROVIDER NOTE - NS ED ROS FT
CONST: No fever, chills or bodyaches  EYES: No pain, redness, drainage or visual changes.  ENT: No ear pain or discharge, nasal discharge or congestion. No sore throat  CARD: (+) chest discomfort, and palpitations  RESP: No SOB, cough, hemoptysis. No hx of asthma or COPD  GI: No abdominal pain, N/V/D  : No urinary symptoms  MS: No joint pain, back pain or extremity pain/injury  SKIN: No rashes  NEURO: No headache, dizziness, paresthesias or LOC

## 2022-02-16 NOTE — ED PROVIDER NOTE - OBJECTIVE STATEMENT
60 y/o female with a PMH of HTN (noncompliant with medicatios amlodipine 10mg once daily, and valsartan 160mg once daily because she does not like the side effects) presents to the ED for evaluation of intermittent, nonradiating midsternal chest discomfort that she does not know how to describe that began last night. pt reports she has difficulty sleeping due to feeling palpitations at night. pt follows cardiologist dr. serrano and tried to get in touch with him but was unsuccessful. pt had a CCTA in [facility] 6/11/21 with a CAD-RADS 2. pt reports she had a headache yesterday that resolved. pt reports she tries to use home remedies for her htn including taking magnesium, and drinking lemon tea. pt denies symptoms at this time. pt denies fhx of cad, hx of blood clots, sob, back pain, recent trauma, rashes, fever, chills, cough, sore throat, recent sick contacts, cigarette smoking, illicit drug use, thyroid abnormalities, leg pain, leg swelling, recent surgeries, recent hospitalizations, hx of cancer, or use of hormones. 60 y/o female with a PMH of HTN (noncompliant with medications amlodipine 10mg once daily, and valsartan 160mg once daily because she does not like the side effects) presents to the ED for evaluation of intermittent, nonradiating midsternal chest discomfort that she does not know how to describe that began last night. pt reports she has difficulty sleeping due to feeling palpitations at night. pt follows cardiologist dr. serrano and tried to get in touch with him but was unsuccessful. pt had a CCTA in [facility] 6/11/21 with a CAD-RADS 2. pt reports she had a headache yesterday that resolved. pt reports she tries to use home remedies for her htn including taking magnesium, and drinking lemon tea. pt denies symptoms at this time. pt denies fhx of cad, hx of blood clots, sob, back pain, recent trauma, rashes, fever, chills, cough, sore throat, recent sick contacts, cigarette smoking, illicit drug use, thyroid abnormalities, leg pain, leg swelling, recent surgeries, recent hospitalizations, hx of cancer, or use of hormones.

## 2022-02-17 LAB
ALBUMIN SERPL ELPH-MCNC: 4.6 G/DL — SIGNIFICANT CHANGE UP (ref 3.5–5.2)
ALP SERPL-CCNC: 99 U/L — SIGNIFICANT CHANGE UP (ref 30–115)
ALT FLD-CCNC: 18 U/L — SIGNIFICANT CHANGE UP (ref 0–41)
ANION GAP SERPL CALC-SCNC: 11 MMOL/L — SIGNIFICANT CHANGE UP (ref 7–14)
ANION GAP SERPL CALC-SCNC: 13 MMOL/L — SIGNIFICANT CHANGE UP (ref 7–14)
AST SERPL-CCNC: 25 U/L — SIGNIFICANT CHANGE UP (ref 0–41)
BILIRUB SERPL-MCNC: 1.1 MG/DL — SIGNIFICANT CHANGE UP (ref 0.2–1.2)
BUN SERPL-MCNC: 17 MG/DL — SIGNIFICANT CHANGE UP (ref 10–20)
BUN SERPL-MCNC: 8 MG/DL — LOW (ref 10–20)
CALCIUM SERPL-MCNC: 9.8 MG/DL — SIGNIFICANT CHANGE UP (ref 8.5–10.1)
CALCIUM SERPL-MCNC: 9.9 MG/DL — SIGNIFICANT CHANGE UP (ref 8.5–10.1)
CHLORIDE SERPL-SCNC: 103 MMOL/L — SIGNIFICANT CHANGE UP (ref 98–110)
CHLORIDE SERPL-SCNC: 99 MMOL/L — SIGNIFICANT CHANGE UP (ref 98–110)
CO2 SERPL-SCNC: 24 MMOL/L — SIGNIFICANT CHANGE UP (ref 17–32)
CO2 SERPL-SCNC: 27 MMOL/L — SIGNIFICANT CHANGE UP (ref 17–32)
CREAT SERPL-MCNC: 0.7 MG/DL — SIGNIFICANT CHANGE UP (ref 0.7–1.5)
CREAT SERPL-MCNC: 1 MG/DL — SIGNIFICANT CHANGE UP (ref 0.7–1.5)
GLUCOSE SERPL-MCNC: 102 MG/DL — HIGH (ref 70–99)
GLUCOSE SERPL-MCNC: 110 MG/DL — HIGH (ref 70–99)
HCT VFR BLD CALC: 38.1 % — SIGNIFICANT CHANGE UP (ref 37–47)
HCV AB S/CO SERPL IA: 0.04 COI — SIGNIFICANT CHANGE UP
HCV AB SERPL-IMP: SIGNIFICANT CHANGE UP
HGB BLD-MCNC: 12.1 G/DL — SIGNIFICANT CHANGE UP (ref 12–16)
MAGNESIUM SERPL-MCNC: 2.3 MG/DL — SIGNIFICANT CHANGE UP (ref 1.8–2.4)
MCHC RBC-ENTMCNC: 27.7 PG — SIGNIFICANT CHANGE UP (ref 27–31)
MCHC RBC-ENTMCNC: 31.8 G/DL — LOW (ref 32–37)
MCV RBC AUTO: 87.2 FL — SIGNIFICANT CHANGE UP (ref 81–99)
NRBC # BLD: 0 /100 WBCS — SIGNIFICANT CHANGE UP (ref 0–0)
PLATELET # BLD AUTO: 302 K/UL — SIGNIFICANT CHANGE UP (ref 130–400)
POTASSIUM SERPL-MCNC: 3.4 MMOL/L — LOW (ref 3.5–5)
POTASSIUM SERPL-MCNC: 3.9 MMOL/L — SIGNIFICANT CHANGE UP (ref 3.5–5)
POTASSIUM SERPL-SCNC: 3.4 MMOL/L — LOW (ref 3.5–5)
POTASSIUM SERPL-SCNC: 3.9 MMOL/L — SIGNIFICANT CHANGE UP (ref 3.5–5)
PROT SERPL-MCNC: 7.5 G/DL — SIGNIFICANT CHANGE UP (ref 6–8)
RBC # BLD: 4.37 M/UL — SIGNIFICANT CHANGE UP (ref 4.2–5.4)
RBC # FLD: 12.4 % — SIGNIFICANT CHANGE UP (ref 11.5–14.5)
SODIUM SERPL-SCNC: 137 MMOL/L — SIGNIFICANT CHANGE UP (ref 135–146)
SODIUM SERPL-SCNC: 140 MMOL/L — SIGNIFICANT CHANGE UP (ref 135–146)
TROPONIN T SERPL-MCNC: <0.01 NG/ML — SIGNIFICANT CHANGE UP
WBC # BLD: 6.65 K/UL — SIGNIFICANT CHANGE UP (ref 4.8–10.8)
WBC # FLD AUTO: 6.65 K/UL — SIGNIFICANT CHANGE UP (ref 4.8–10.8)

## 2022-02-17 PROCEDURE — 93306 TTE W/DOPPLER COMPLETE: CPT | Mod: 26

## 2022-02-17 PROCEDURE — 99233 SBSQ HOSP IP/OBS HIGH 50: CPT

## 2022-02-17 RX ORDER — VALSARTAN 80 MG/1
160 TABLET ORAL AT BEDTIME
Refills: 0 | Status: DISCONTINUED | OUTPATIENT
Start: 2022-02-18 | End: 2022-02-18

## 2022-02-17 RX ORDER — VALSARTAN 80 MG/1
120 TABLET ORAL ONCE
Refills: 0 | Status: COMPLETED | OUTPATIENT
Start: 2022-02-17 | End: 2022-02-17

## 2022-02-17 RX ORDER — CHLORHEXIDINE GLUCONATE 213 G/1000ML
1 SOLUTION TOPICAL
Refills: 0 | Status: DISCONTINUED | OUTPATIENT
Start: 2022-02-17 | End: 2022-02-20

## 2022-02-17 RX ORDER — POTASSIUM CHLORIDE 20 MEQ
40 PACKET (EA) ORAL ONCE
Refills: 0 | Status: COMPLETED | OUTPATIENT
Start: 2022-02-17 | End: 2022-02-17

## 2022-02-17 RX ORDER — POTASSIUM CHLORIDE 20 MEQ
20 PACKET (EA) ORAL
Refills: 0 | Status: DISCONTINUED | OUTPATIENT
Start: 2022-02-17 | End: 2022-02-17

## 2022-02-17 RX ORDER — SENNA PLUS 8.6 MG/1
2 TABLET ORAL AT BEDTIME
Refills: 0 | Status: DISCONTINUED | OUTPATIENT
Start: 2022-02-17 | End: 2022-02-20

## 2022-02-17 RX ORDER — HYDROCHLOROTHIAZIDE 25 MG
25 TABLET ORAL DAILY
Refills: 0 | Status: DISCONTINUED | OUTPATIENT
Start: 2022-02-17 | End: 2022-02-17

## 2022-02-17 RX ADMIN — Medication 0.1 MILLIGRAM(S): at 20:49

## 2022-02-17 RX ADMIN — VALSARTAN 120 MILLIGRAM(S): 80 TABLET ORAL at 17:33

## 2022-02-17 RX ADMIN — AMLODIPINE BESYLATE 10 MILLIGRAM(S): 2.5 TABLET ORAL at 05:49

## 2022-02-17 RX ADMIN — Medication 40 MILLIEQUIVALENT(S): at 09:16

## 2022-02-17 RX ADMIN — ATORVASTATIN CALCIUM 40 MILLIGRAM(S): 80 TABLET, FILM COATED ORAL at 21:02

## 2022-02-17 RX ADMIN — Medication 81 MILLIGRAM(S): at 11:14

## 2022-02-17 RX ADMIN — CHLORHEXIDINE GLUCONATE 1 APPLICATION(S): 213 SOLUTION TOPICAL at 05:49

## 2022-02-17 RX ADMIN — Medication 25 MILLIGRAM(S): at 09:16

## 2022-02-17 RX ADMIN — VALSARTAN 160 MILLIGRAM(S): 80 TABLET ORAL at 05:49

## 2022-02-17 RX ADMIN — SENNA PLUS 2 TABLET(S): 8.6 TABLET ORAL at 21:02

## 2022-02-17 NOTE — PROGRESS NOTE ADULT - ASSESSMENT
IMPRESSION  # Hypertensive urgency IMPRESSION:    Hypertensive urgency    PLAN:    CNS:   -no depressants.     HEENT:   -Oral care    PULMONARY:    -HOB @ 45 degrees    CARDIOVASCULAR:   - s/p Labetalol 20 mg IV push, 1 of Hydralazine 10 mg IV push   - c/w amlodipine and valsartan and monitor for side effect.  - Monitor on tele.  - Check TTE    GI:   -GI prophylaxis.    -oral feeds    RENAL:    -Follow up lytes.  Correct as needed.     INFECTIOUS DISEASE:   -CHG 4% daily bath    HEMATOLOGICAL:    -DVT prophylaxis.    ENDOCRINE:    -Follow up FS.  -insulin sliding scale if needed    MUSCULOSKELETAL:   -increase ambulation as tolerated    d/g to telemetry   IMPRESSION:    Hypertensive urgency    PLAN:    CNS:   -no depressants.     HEENT:   -Oral care    PULMONARY:    -HOB @ 45 degrees    CARDIOVASCULAR:   - s/p Labetalol 20 mg IV push, 1 of Hydralazine 10 mg IV push   - c/w amlodipine and valsartan and monitor for side effect.  - Monitor on tele.  - Check TTE    GI:   -GI prophylaxis.    -oral feeds    RENAL:    -Follow up lytes.  Correct as needed.     INFECTIOUS DISEASE:   -CHG 4% daily bath    HEMATOLOGICAL:    -DVT prophylaxis.    ENDOCRINE:    -Follow up FS.  -insulin sliding scale if needed    MUSCULOSKELETAL:   -increase ambulation as tolerated    CCU monitoring   IMPRESSION:    Hypertensive urgency    PLAN:    CNS:   -no depressants.     HEENT:   -Oral care    PULMONARY:    -HOB @ 45 degrees    CARDIOVASCULAR:   - s/p Labetalol 20 mg IV push, 1 of Hydralazine 10 mg IV push   - c/w amlodipine and valsartan   -add HCTZ 25mg  - Monitor on tele.  - Check TTE    GI:   -GI prophylaxis.    -oral feeds    RENAL:    -Follow up lytes.  Correct as needed.     INFECTIOUS DISEASE:   -CHG 4% daily bath    HEMATOLOGICAL:    -DVT prophylaxis.    ENDOCRINE:    -Follow up FS.  -insulin sliding scale if needed    MUSCULOSKELETAL:   -increase ambulation as tolerated    CCU monitoring   IMPRESSION:    Hypertensive emergency (chest discomfort)    PLAN:    CNS:   -no depressants.     HEENT:   -Oral care    PULMONARY:    -HOB @ 45 degrees    CARDIOVASCULAR:   - s/p Labetalol 20 mg IV push, 1 of Hydralazine 10 mg IV push   - c/w amlodipine and valsartan   - add HCTZ 25mg  - Monitor on tele.  - Check TTE    GI:   -GI prophylaxis.    -oral feeds    RENAL:    -Follow up lytes.  Correct as needed.     INFECTIOUS DISEASE:   -CHG 4% daily bath    HEMATOLOGICAL:    -DVT prophylaxis.    ENDOCRINE:    -Follow up FS.  -insulin sliding scale if needed    MUSCULOSKELETAL:   -increase ambulation as tolerated    CCU monitoring

## 2022-02-17 NOTE — PROGRESS NOTE ADULT - SUBJECTIVE AND OBJECTIVE BOX
CHANI SCHMID, 61y, Female; MRN# [id number]  Hospital Stay: 1d.    Patient is a 61y old Female who presents with a chief complaint of Chest discomfort (16 Feb 2022 18:55)  Currently admitted to the ICU with the primary diagnosis of Hypertension    Hypertensive urgency      SUBJECTIVE:  Interval/Overnight events: No overnight events. Pt feeling well.    REVIEW OF SYSTEMS:  As per HPI  OBJECTIVE:  VITALS:  T(F): 97.9 (02-17-22 @ 04:00), Max: 99.2 (02-16-22 @ 12:25)  HR: 85 (02-17-22 @ 06:00) (59 - 105)  BP: 169/91 (02-17-22 @ 06:00) (113/57 - 232/122)  ABP: --  ABP(mean): --  RR: 24 (02-17-22 @ 06:00) (13 - 24)  SpO2: 99% (02-17-22 @ 06:00) (97% - 100%)      I&Os:    02-16-22 @ 07:01  -  02-17-22 @ 06:35  --------------------------------------------------------  IN: 240 mL / OUT: 650 mL / NET: -410 mL      PHYSICAL EXAM:  CONST: well appearing for age  NECK:  supple  CARDIAC:  regular rate and rhythm, normal S1 and S2  RESP:  respiratory rate and effort appear normal for age; lungs are clear to auscultation bilaterally  ABDOMEN:  soft, nontender  MUSCULOSKELETAL/NEURO:  normal movement, normal tone  SKIN:  well-perfused; warm and dry    ACTIVE MEDICATIONS:  Standing  amLODIPine   Tablet 10 milliGRAM(s) Oral daily  aspirin  chewable 81 milliGRAM(s) Oral daily  atorvastatin 40 milliGRAM(s) Oral at bedtime  chlorhexidine 4% Liquid 1 Application(s) Topical <User Schedule>  influenza   Vaccine 0.5 milliLiter(s) IntraMuscular once  valsartan 160 milliGRAM(s) Oral daily    PRN    LABS:  02-16-22 @ 17:34  WBC --, H/H --/--, plt --  Na --, K --, Cl --, CO2 --, BUN --, Cr --, Glu --    Ca --, Mg 2.4, Phosphorus --    Tot Protein --, Alb --, T. Bili --, D. Bili --  AST --, ALT --, Alk phos --    02-16-22 @ 13:55  WBC 6.09, H/H 12.0/38.1, plt 302  Na 141, K 3.6, Cl 103, CO2 24, BUN 10, Cr 0.7, Glu 125<H>    Ca 9.8, Mg --, Phosphorus --    Tot Protein 8.0, Alb 4.9, T. Bili 0.4, D. Bili --  AST 22, ALT 18, Alk phos 104                02-16-22 @ 23:30:  Troponin T <0.01  02-16-22 @ 13:55:  Troponin T <0.01    02-16-22 @ 17:25:  COVID-19 PCR: NotDetec      IMAGING:  < from: Xray Chest 2 Views PA/Lat (02.16.22 @ 14:48) >  Impression:    No radiographic evidence of acute cardiopulmonary disease.    < end of copied text >   CHANI SCHMID, 61y, Female; MRN# [id number]  Hospital Stay: 1d.    Patient is a 61y old Female who presents with a chief complaint of Chest discomfort (16 Feb 2022 18:55)  Currently admitted to the ICU with the primary diagnosis of Hypertension    Hypertensive urgency      SUBJECTIVE:  Interval/Overnight events: No overnight events. Pt feeling well.    REVIEW OF SYSTEMS:  As per HPI  OBJECTIVE:  VITALS:  T(F): 97.9 (02-17-22 @ 04:00), Max: 99.2 (02-16-22 @ 12:25)  HR: 85 (02-17-22 @ 06:00) (59 - 105)  BP: 169/91 (02-17-22 @ 06:00) (113/57 - 232/122)  ABP: --  ABP(mean): --  RR: 24 (02-17-22 @ 06:00) (13 - 24)  SpO2: 99% (02-17-22 @ 06:00) (97% - 100%)      I&Os:    02-16-22 @ 07:01  -  02-17-22 @ 06:35  --------------------------------------------------------  IN: 240 mL / OUT: 650 mL / NET: -410 mL      PHYSICAL EXAM:  CONST: well appearing for age  NECK:  supple  CARDIAC:  regular rate and rhythm, normal S1 and S2  RESP:  respiratory rate and effort appear normal for age; lungs are clear to auscultation bilaterally  ABDOMEN:  soft, nontender  MUSCULOSKELETAL/NEURO:  normal movement, normal tone  SKIN:  well-perfused; warm and dry    ACTIVE MEDICATIONS:  Standing  amLODIPine   Tablet 10 milliGRAM(s) Oral daily  aspirin  chewable 81 milliGRAM(s) Oral daily  atorvastatin 40 milliGRAM(s) Oral at bedtime  chlorhexidine 4% Liquid 1 Application(s) Topical <User Schedule>  influenza   Vaccine 0.5 milliLiter(s) IntraMuscular once  valsartan 160 milliGRAM(s) Oral daily    PRN    LABS:  02-16-22 @ 17:34  WBC --, H/H --/--, plt --  Na --, K --, Cl --, CO2 --, BUN --, Cr --, Glu --    Ca --, Mg 2.4, Phosphorus --    Tot Protein --, Alb --, T. Bili --, D. Bili --  AST --, ALT --, Alk phos --    02-16-22 @ 13:55  WBC 6.09, H/H 12.0/38.1, plt 302  Na 141, K 3.6, Cl 103, CO2 24, BUN 10, Cr 0.7, Glu 125<H>    Ca 9.8, Mg --, Phosphorus --    Tot Protein 8.0, Alb 4.9, T. Bili 0.4, D. Bili --  AST 22, ALT 18, Alk phos 104    02-16-22 @ 23:30:  Troponin T <0.01  02-16-22 @ 13:55:  Troponin T <0.01    02-16-22 @ 17:25:  COVID-19 PCR: NotDetec      IMAGING:  < from: Xray Chest 2 Views PA/Lat (02.16.22 @ 14:48) >  Impression:    No radiographic evidence of acute cardiopulmonary disease.    < end of copied text >

## 2022-02-18 ENCOUNTER — TRANSCRIPTION ENCOUNTER (OUTPATIENT)
Age: 62
End: 2022-02-18

## 2022-02-18 LAB
ALBUMIN SERPL ELPH-MCNC: 4.3 G/DL — SIGNIFICANT CHANGE UP (ref 3.5–5.2)
ALP SERPL-CCNC: 92 U/L — SIGNIFICANT CHANGE UP (ref 30–115)
ALT FLD-CCNC: 16 U/L — SIGNIFICANT CHANGE UP (ref 0–41)
ANION GAP SERPL CALC-SCNC: 13 MMOL/L — SIGNIFICANT CHANGE UP (ref 7–14)
AST SERPL-CCNC: 19 U/L — SIGNIFICANT CHANGE UP (ref 0–41)
BASOPHILS # BLD AUTO: 0.02 K/UL — SIGNIFICANT CHANGE UP (ref 0–0.2)
BASOPHILS NFR BLD AUTO: 0.3 % — SIGNIFICANT CHANGE UP (ref 0–1)
BILIRUB SERPL-MCNC: 0.8 MG/DL — SIGNIFICANT CHANGE UP (ref 0.2–1.2)
BUN SERPL-MCNC: 23 MG/DL — HIGH (ref 10–20)
CALCIUM SERPL-MCNC: 9.6 MG/DL — SIGNIFICANT CHANGE UP (ref 8.5–10.1)
CHLORIDE SERPL-SCNC: 102 MMOL/L — SIGNIFICANT CHANGE UP (ref 98–110)
CO2 SERPL-SCNC: 24 MMOL/L — SIGNIFICANT CHANGE UP (ref 17–32)
CREAT SERPL-MCNC: 1.1 MG/DL — SIGNIFICANT CHANGE UP (ref 0.7–1.5)
EOSINOPHIL # BLD AUTO: 0.21 K/UL — SIGNIFICANT CHANGE UP (ref 0–0.7)
EOSINOPHIL NFR BLD AUTO: 3.1 % — SIGNIFICANT CHANGE UP (ref 0–8)
GLUCOSE SERPL-MCNC: 120 MG/DL — HIGH (ref 70–99)
HCT VFR BLD CALC: 36.4 % — LOW (ref 37–47)
HGB BLD-MCNC: 11.5 G/DL — LOW (ref 12–16)
IMM GRANULOCYTES NFR BLD AUTO: 0.3 % — SIGNIFICANT CHANGE UP (ref 0.1–0.3)
LYMPHOCYTES # BLD AUTO: 2.06 K/UL — SIGNIFICANT CHANGE UP (ref 1.2–3.4)
LYMPHOCYTES # BLD AUTO: 30.8 % — SIGNIFICANT CHANGE UP (ref 20.5–51.1)
MAGNESIUM SERPL-MCNC: 2.2 MG/DL — SIGNIFICANT CHANGE UP (ref 1.8–2.4)
MCHC RBC-ENTMCNC: 26.9 PG — LOW (ref 27–31)
MCHC RBC-ENTMCNC: 31.6 G/DL — LOW (ref 32–37)
MCV RBC AUTO: 85.2 FL — SIGNIFICANT CHANGE UP (ref 81–99)
MONOCYTES # BLD AUTO: 0.65 K/UL — HIGH (ref 0.1–0.6)
MONOCYTES NFR BLD AUTO: 9.7 % — HIGH (ref 1.7–9.3)
NEUTROPHILS # BLD AUTO: 3.72 K/UL — SIGNIFICANT CHANGE UP (ref 1.4–6.5)
NEUTROPHILS NFR BLD AUTO: 55.8 % — SIGNIFICANT CHANGE UP (ref 42.2–75.2)
NRBC # BLD: 0 /100 WBCS — SIGNIFICANT CHANGE UP (ref 0–0)
PLATELET # BLD AUTO: 286 K/UL — SIGNIFICANT CHANGE UP (ref 130–400)
POTASSIUM SERPL-MCNC: 3.8 MMOL/L — SIGNIFICANT CHANGE UP (ref 3.5–5)
POTASSIUM SERPL-SCNC: 3.8 MMOL/L — SIGNIFICANT CHANGE UP (ref 3.5–5)
PROT SERPL-MCNC: 7.1 G/DL — SIGNIFICANT CHANGE UP (ref 6–8)
RBC # BLD: 4.27 M/UL — SIGNIFICANT CHANGE UP (ref 4.2–5.4)
RBC # FLD: 12.4 % — SIGNIFICANT CHANGE UP (ref 11.5–14.5)
SODIUM SERPL-SCNC: 139 MMOL/L — SIGNIFICANT CHANGE UP (ref 135–146)
WBC # BLD: 6.68 K/UL — SIGNIFICANT CHANGE UP (ref 4.8–10.8)
WBC # FLD AUTO: 6.68 K/UL — SIGNIFICANT CHANGE UP (ref 4.8–10.8)

## 2022-02-18 PROCEDURE — 99232 SBSQ HOSP IP/OBS MODERATE 35: CPT

## 2022-02-18 RX ORDER — VALSARTAN 80 MG/1
320 TABLET ORAL AT BEDTIME
Refills: 0 | Status: DISCONTINUED | OUTPATIENT
Start: 2022-02-18 | End: 2022-02-20

## 2022-02-18 RX ORDER — VALSARTAN 80 MG/1
320 TABLET ORAL DAILY
Refills: 0 | Status: DISCONTINUED | OUTPATIENT
Start: 2022-02-18 | End: 2022-02-18

## 2022-02-18 RX ORDER — VALSARTAN 80 MG/1
160 TABLET ORAL DAILY
Refills: 0 | Status: DISCONTINUED | OUTPATIENT
Start: 2022-02-18 | End: 2022-02-18

## 2022-02-18 RX ORDER — POTASSIUM CHLORIDE 20 MEQ
20 PACKET (EA) ORAL ONCE
Refills: 0 | Status: COMPLETED | OUTPATIENT
Start: 2022-02-18 | End: 2022-02-18

## 2022-02-18 RX ADMIN — ATORVASTATIN CALCIUM 40 MILLIGRAM(S): 80 TABLET, FILM COATED ORAL at 21:05

## 2022-02-18 RX ADMIN — VALSARTAN 320 MILLIGRAM(S): 80 TABLET ORAL at 21:04

## 2022-02-18 RX ADMIN — CHLORHEXIDINE GLUCONATE 1 APPLICATION(S): 213 SOLUTION TOPICAL at 05:17

## 2022-02-18 RX ADMIN — SENNA PLUS 2 TABLET(S): 8.6 TABLET ORAL at 21:05

## 2022-02-18 RX ADMIN — Medication 81 MILLIGRAM(S): at 11:16

## 2022-02-18 RX ADMIN — AMLODIPINE BESYLATE 10 MILLIGRAM(S): 2.5 TABLET ORAL at 05:15

## 2022-02-18 RX ADMIN — Medication 20 MILLIEQUIVALENT(S): at 08:14

## 2022-02-18 NOTE — DISCHARGE NOTE PROVIDER - CARE PROVIDER_API CALL
Josh Sotelo)  Cardiovascular Disease; Internal Medicine  [address] [place] [address]  Phone: [phone]  Fax: [phone]  Follow Up Time: 2 weeks

## 2022-02-18 NOTE — DISCHARGE NOTE PROVIDER - NS AS DC PROVIDER CONTACT Y/N MULTI
[id number]  1954      MANOMETRY DISCHARGE INSTRUCTION    You may resume your regular diet as tolerated. You may resume your normal daily activities. Call your Physician if you have any complications or questions. [employer] Activation    Thank you for requesting access to [employer]. Please follow the instructions below to securely access and download your online medical record. [employer] allows you to send messages to your doctor, view your test results, renew your prescriptions, schedule appointments, and more. How Do I Sign Up? 1. In your internet browser, go to [email or web address]  2. Click on the First Time User? Click Here link in the Sign In box. You will be redirect to the New Member Sign Up page. 3. Enter your [employer] Access Code exactly as it appears below. You will not need to use this code after youve completed the sign-up process. If you do not sign up before the expiration date, you must request a new code. [employer] Access Code: Activation code not generated  Current [employer] Status: Active (This is the date your Aut[employer] access code will )    4. Enter the last four digits of your Social Security Number (xxxx) and Date of Birth (mm/dd/yyyy) as indicated and click Submit. You will be taken to the next sign-up page. 5. Create a Authy ID. This will be your [employer] login ID and cannot be changed, so think of one that is secure and easy to remember. 6. Create a [employer] password. You can change your password at any time. 7. Enter your Password Reset Question and Answer. This can be used at a later time if you forget your password. 8. Enter your e-mail address. You will receive e-mail notification when new information is available in [address] 9. Click Sign Up. You can now view and download portions of your medical record. 10. Click the Download Summary menu link to download a portable copy of your medical information.     Additional Information    If you have questions, please visit the Frequently Asked Questions section of the [employer] website at [email or web address] Remember, [employer] is NOT to be used for urgent needs. For medical emergencies, dial 911. Yes

## 2022-02-18 NOTE — PROGRESS NOTE ADULT - SUBJECTIVE AND OBJECTIVE BOX
CHANI SCHMID, 61y, Female; MRN# [id number]  Hospital Stay: 2d.    Patient is a 61y old Female who presents with a chief complaint of Chest discomfort (17 Feb 2022 06:35)  Currently admitted to the ICU with the primary diagnosis of Hypertension    Hypertensive urgency      SUBJECTIVE:  Interval/Overnight events: Overnight, pt's pressure was still elevated to the 190s systolic, given 0.1 of clonidine.     REVIEW OF SYSTEMS:  As per HPI  OBJECTIVE:  VITALS:  T(F): 97.4 (02-18-22 @ 04:00), Max: 98.1 (02-17-22 @ 20:00)  HR: 64 (02-18-22 @ 06:00) (61 - 100)  BP: 153/90 (02-18-22 @ 06:00) (97/56 - 204/99)  ABP: --  ABP(mean): --  RR: 16 (02-18-22 @ 06:00) (16 - 19)  SpO2: 96% (02-18-22 @ 06:00) (95% - 99%)      I&Os:    02-16-22 @ 07:01  -  02-17-22 @ 07:00  --------------------------------------------------------  IN: 240 mL / OUT: 650 mL / NET: -410 mL    02-17-22 @ 07:01  -  02-18-22 @ 06:51  --------------------------------------------------------  IN: 1100 mL / OUT: 1250 mL / NET: -150 mL      PHYSICAL EXAM:  CONST: well appearing for age  NECK:  supple  CARDIAC:  regular rate and rhythm, normal S1 and S2  RESP:  respiratory rate and effort appear normal for age; lungs are clear to auscultation bilaterally  ABDOMEN:  soft, nontender  MUSCULOSKELETAL/NEURO:  normal movement, normal tone  SKIN:  well-perfused; warm and dry    ACTIVE MEDICATIONS:  Standing  amLODIPine   Tablet 10 milliGRAM(s) Oral daily  aspirin  chewable 81 milliGRAM(s) Oral daily  atorvastatin 40 milliGRAM(s) Oral at bedtime  chlorhexidine 4% Liquid 1 Application(s) Topical <User Schedule>  influenza   Vaccine 0.5 milliLiter(s) IntraMuscular once  senna 2 Tablet(s) Oral at bedtime  valsartan 160 milliGRAM(s) Oral at bedtime    PRN    LABS:  02-18-22 @ 04:30  WBC 6.68, H/H 11.5<L>/36.4<L>, plt 286  Na 139, K 3.8, Cl 102, CO2 24, BUN 23<H>, Cr 1.1, Glu 120<H>    Ca 9.6, Mg 2.2, Phosphorus --    Tot Protein 7.1, Alb 4.3, T. Bili 0.8, D. Bili --  AST 19, ALT 16, Alk phos 92    02-17-22 @ 16:00  WBC --, H/H --/--, plt --  Na 137, K 3.9, Cl 99, CO2 27, BUN 17, Cr 1.0, Glu 102<H>    Ca 9.9, Mg --, Phosphorus --    Tot Protein --, Alb --, T. Bili --, D. Bili --  AST --, ALT --, Alk phos --    02-17-22 @ 10:30  WBC --, H/H --/--, plt --  Na --, K --, Cl --, CO2 --, BUN --, Cr --, Glu --    Ca --, Mg 2.3, Phosphorus --    Tot Protein --, Alb --, T. Bili --, D. Bili --  AST --, ALT --, Alk phos --                02-16-22 @ 23:30:  Troponin T <0.01  02-16-22 @ 13:55:  Troponin T <0.01    02-16-22 @ 17:25:  COVID-19 PCR: NotDetec        IMAGING:  < from: Xray Chest 2 Views PA/Lat (02.16.22 @ 14:48) >  Impression:    No radiographic evidence of acute cardiopulmonary disease.    < end of copied text >      ECHO:  < from: TTE Echo Complete w/o Contrast w/ Doppler (02.17.22 @ 14:28) >    Summary:   1. LV Ejection Fraction by Kuhn's Method with a biplane EF of 55 %.   2. Mild concentric left ventricular hypertrophy.   3. Spectral Doppler shows impaired relaxation pattern of left   ventricular myocardial filling (Grade I diastolic dysfunction).   4. Normal left atrial size.   5. Normal right atrial size.   6. No evidence of mitral valve regurgitation.    < end of copied text >

## 2022-02-18 NOTE — DISCHARGE NOTE PROVIDER - NSDCMRMEDTOKEN_GEN_ALL_CORE_FT
amLODIPine 10 mg oral tablet: 1 tab(s) orally once a day   Aspirin Low Strength 81 mg oral delayed release tablet: 1 tab(s) orally once a day   Lipitor 20 mg oral tablet: 1 tab(s) orally once a day   tiZANidine 2 mg oral tablet: 2 tab(s) orally every 8 hours   valsartan 160 mg oral tablet: 1 tab(s) orally once a day   amLODIPine 10 mg oral tablet: 1 tab(s) orally once a day   Aspirin Low Strength 81 mg oral delayed release tablet: 1 tab(s) orally once a day   atenolol 50 mg oral tablet: 1 tab(s) orally once a day  atorvastatin 40 mg oral tablet: 1 tab(s) orally once a day (at bedtime)  tiZANidine 2 mg oral tablet: 2 tab(s) orally every 8 hours   valsartan 320 mg oral tablet: 1 tab(s) orally once a day (at bedtime)

## 2022-02-18 NOTE — DISCHARGE NOTE PROVIDER - NSDCCPCAREPLAN_GEN_ALL_CORE_FT
PRINCIPAL DISCHARGE DIAGNOSIS  Diagnosis: Hypertensive emergency  Assessment and Plan of Treatment: You came into the ED with a really high blood pressure and feelings of chest discomfort. You were given medications to help bring it down. Please take your medications as prescribed and follow up with Dr. Sotelo in 2 weeks.

## 2022-02-18 NOTE — DISCHARGE NOTE PROVIDER - HOSPITAL COURSE
Pt is a 61 year old Female with PMH of HTN who presented to the ED with intermittent midsternal chest discomfort for 1 day. Patient was doing well until the day prior to admission when she started experiencing midsternal chest discomfort. Patient described the discomfort as pressure like pain, non radiating, non exertion related, and associated with headache.  Patient reported she had difficulty sleeping due to feeling palpitations at night. Of note, patient had not been checking her bp at home or taking her Valsartan 160 mg for "a while" because according to the patient it makes her feel dizzy/lightheaded and she feels like fainting when she takes it. The headache was described as a pounding pressure with no radiation and it wasn't associated with nausea, vomiting, lightheadedness or any other symptoms.    In the ED, /122, , satting 100% on RA. Troponin negative x 2. ECG showed NSR with LVH. Given 10 amlodipine, 10 hydralazine po, 10 hydralazine IV, 20 labetalol IV, 160 valsartan. Admitted to CCU. TTE on 2/17 showed EF 55% and mild concentric left ventricular hypertrophy. Pt was restarted on her home meds (amlodipine and valsartan) but switched to amlodipine in the AM and valsartan 160 at bedtime. On 2/17 around 5pm, BP went up to 190s systolic so pt was given 120 valsartan which controlled BP to 140s systolic. At 8pm, pt's BP brook again to 197/92 and pt was given 0.1 of clonidine which brought BP down to 130s systolic. Pt's medications then changed to 10 amlodipine in the AM and 320 valsartan at bedtime.    Pt is a 61 year old Female with PMH of HTN who presented to the ED with intermittent midsternal chest discomfort for 1 day. Patient was doing well until the day prior to admission when she started experiencing midsternal chest discomfort. Patient described the discomfort as pressure like pain, non radiating, non exertion related, and associated with headache.  Patient reported she had difficulty sleeping due to feeling palpitations at night. Of note, patient had not been checking her bp at home or taking her Valsartan 160 mg for "a while" because according to the patient it makes her feel dizzy/lightheaded and she feels like fainting when she takes it. The headache was described as a pounding pressure with no radiation and it wasn't associated with nausea, vomiting, lightheadedness or any other symptoms.    In the ED, /122, , satting 100% on RA. Troponin negative x 2. ECG showed NSR with LVH. Given 10 amlodipine, 10 hydralazine po, 10 hydralazine IV, 20 labetalol IV, 160 valsartan. Admitted to CCU. TTE on 2/17 showed EF 55% and mild concentric left ventricular hypertrophy. Pt was restarted on her home meds (amlodipine and valsartan) but switched to amlodipine in the AM and valsartan 160 at bedtime. On 2/17 around 5pm, BP went up to 190s systolic so pt was given 120 valsartan which controlled BP to 140s systolic. At 8pm, pt's BP brook again to 197/92 and she was given 0.1 of clonidine which brought BP down to 130s systolic. Pt's medications then changed to 10 amlodipine in the AM and 320 valsartan at bedtime. Pt to follow up outpatient with Dr. Sotelo. Pt is a 61 year old Female with PMH of HTN who presented to the ED with intermittent midsternal chest discomfort for 1 day. Patient was doing well until the day prior to admission when she started experiencing midsternal chest discomfort. Patient described the discomfort as pressure like pain, non radiating, non exertion related, and associated with headache.  Patient reported she had difficulty sleeping due to feeling palpitations at night. Of note, patient had not been checking her bp at home or taking her Valsartan 160 mg for "a while" because according to the patient it makes her feel dizzy/lightheaded and she feels like fainting when she takes it. The headache was described as a pounding pressure with no radiation and it wasn't associated with nausea, vomiting, lightheadedness or any other symptoms.    In the ED, /122, , satting 100% on RA. Troponin negative x 2. ECG showed NSR with LVH. Given 10 amlodipine, 10 hydralazine po, 10 hydralazine IV, 20 labetalol IV, 160 valsartan. Admitted to CCU. TTE on 2/17 showed EF 55% and mild concentric left ventricular hypertrophy. Pt was restarted on her home meds (amlodipine and valsartan) but switched to amlodipine in the AM and valsartan 160 at bedtime. On 2/17 around 5pm, BP went up to 190s systolic so pt was given 120 valsartan which controlled BP to 140s systolic. At 8pm, pt's BP brook again to 197/92 and she was given 0.1 of clonidine which brought BP down to 130s systolic. Pt's medications then changed to 10 amlodipine, atenolol 50 in the AM and 320 valsartan at bedtime. Pt to follow up outpatient with Dr. Sotelo.

## 2022-02-18 NOTE — PROGRESS NOTE ADULT - ASSESSMENT
IMPRESSION  Hypertensive emergency (chest discomfort)    PLAN:    CNS:   -no depressants.     HEENT:   -Oral care    PULMONARY:    -HOB @ 45 degrees    CARDIOVASCULAR:   - s/p Labetalol 20 mg IV push, 1 of Hydralazine 10 mg IV push   - c/w amlodipine and valsartan   - add HCTZ 25mg  - Monitor on tele.  - Check TTE    GI:   -GI prophylaxis.    -oral feeds    RENAL:    -Follow up lytes.  Correct as needed.     INFECTIOUS DISEASE:   -CHG 4% daily bath    HEMATOLOGICAL:    -DVT prophylaxis.    ENDOCRINE:    -Follow up FS.  -insulin sliding scale if needed    MUSCULOSKELETAL:   -increase ambulation as tolerated    CCU monitoring   IMPRESSION  Hypertensive emergency (chest discomfort)    PLAN:    CNS:   -no depressants.     HEENT:   -Oral care    PULMONARY:    -HOB @ 45 degrees    CARDIOVASCULAR:   - s/p Labetalol 20 mg IV push, 1 of Hydralazine 10 mg IV push   - c/w amlodipine and valsartan   - TTE: EF 55%, G1DD    GI:   -GI prophylaxis.    -oral feeds    RENAL:    -Follow up lytes.  Correct as needed.     INFECTIOUS DISEASE:   -CHG 4% daily bath    HEMATOLOGICAL:    -DVT prophylaxis.    ENDOCRINE:    -Follow up FS.  -insulin sliding scale if needed    MUSCULOSKELETAL:   -increase ambulation as tolerated    possible d/g to tele today   IMPRESSION  Hypertensive emergency (chest discomfort), resolved    PLAN:    CNS:   -no depressants.     HEENT:   -Oral care    PULMONARY:    -HOB @ 45 degrees    CARDIOVASCULAR:   - s/p Labetalol 20 mg IV push, 1 of Hydralazine 10 mg IV push, clonidine 0.1mg x1  - c/w amlodipine   - increase valsartan to 320mg in the evening  - may need to add clonidine 0.1mg if bp still elevated  - TTE: EF 55%, G1DD    GI:   -GI prophylaxis.    -oral feeds    RENAL:    -Follow up lytes.  Correct as needed.     INFECTIOUS DISEASE:   -CHG 4% daily bath    HEMATOLOGICAL:    -DVT prophylaxis.    ENDOCRINE:    -Follow up FS.  -insulin sliding scale if needed    MUSCULOSKELETAL:   -increase ambulation as tolerated    d/c home tomorrow if bp OK

## 2022-02-19 LAB
ALBUMIN SERPL ELPH-MCNC: 4.3 G/DL — SIGNIFICANT CHANGE UP (ref 3.5–5.2)
ALP SERPL-CCNC: 96 U/L — SIGNIFICANT CHANGE UP (ref 30–115)
ALT FLD-CCNC: 15 U/L — SIGNIFICANT CHANGE UP (ref 0–41)
ANION GAP SERPL CALC-SCNC: 11 MMOL/L — SIGNIFICANT CHANGE UP (ref 7–14)
AST SERPL-CCNC: 18 U/L — SIGNIFICANT CHANGE UP (ref 0–41)
BASOPHILS # BLD AUTO: 0.03 K/UL — SIGNIFICANT CHANGE UP (ref 0–0.2)
BASOPHILS NFR BLD AUTO: 0.5 % — SIGNIFICANT CHANGE UP (ref 0–1)
BILIRUB SERPL-MCNC: 0.8 MG/DL — SIGNIFICANT CHANGE UP (ref 0.2–1.2)
BUN SERPL-MCNC: 15 MG/DL — SIGNIFICANT CHANGE UP (ref 10–20)
CALCIUM SERPL-MCNC: 9.5 MG/DL — SIGNIFICANT CHANGE UP (ref 8.5–10.1)
CHLORIDE SERPL-SCNC: 106 MMOL/L — SIGNIFICANT CHANGE UP (ref 98–110)
CO2 SERPL-SCNC: 26 MMOL/L — SIGNIFICANT CHANGE UP (ref 17–32)
CREAT SERPL-MCNC: 0.8 MG/DL — SIGNIFICANT CHANGE UP (ref 0.7–1.5)
EOSINOPHIL # BLD AUTO: 0.2 K/UL — SIGNIFICANT CHANGE UP (ref 0–0.7)
EOSINOPHIL NFR BLD AUTO: 3.3 % — SIGNIFICANT CHANGE UP (ref 0–8)
GLUCOSE SERPL-MCNC: 121 MG/DL — HIGH (ref 70–99)
HCT VFR BLD CALC: 36.3 % — LOW (ref 37–47)
HGB BLD-MCNC: 11.3 G/DL — LOW (ref 12–16)
IMM GRANULOCYTES NFR BLD AUTO: 0.2 % — SIGNIFICANT CHANGE UP (ref 0.1–0.3)
LYMPHOCYTES # BLD AUTO: 1.66 K/UL — SIGNIFICANT CHANGE UP (ref 1.2–3.4)
LYMPHOCYTES # BLD AUTO: 27.7 % — SIGNIFICANT CHANGE UP (ref 20.5–51.1)
MAGNESIUM SERPL-MCNC: 2.1 MG/DL — SIGNIFICANT CHANGE UP (ref 1.8–2.4)
MCHC RBC-ENTMCNC: 27 PG — SIGNIFICANT CHANGE UP (ref 27–31)
MCHC RBC-ENTMCNC: 31.1 G/DL — LOW (ref 32–37)
MCV RBC AUTO: 86.8 FL — SIGNIFICANT CHANGE UP (ref 81–99)
MONOCYTES # BLD AUTO: 0.53 K/UL — SIGNIFICANT CHANGE UP (ref 0.1–0.6)
MONOCYTES NFR BLD AUTO: 8.8 % — SIGNIFICANT CHANGE UP (ref 1.7–9.3)
NEUTROPHILS # BLD AUTO: 3.56 K/UL — SIGNIFICANT CHANGE UP (ref 1.4–6.5)
NEUTROPHILS NFR BLD AUTO: 59.5 % — SIGNIFICANT CHANGE UP (ref 42.2–75.2)
NRBC # BLD: 0 /100 WBCS — SIGNIFICANT CHANGE UP (ref 0–0)
PLATELET # BLD AUTO: 277 K/UL — SIGNIFICANT CHANGE UP (ref 130–400)
POTASSIUM SERPL-MCNC: 4 MMOL/L — SIGNIFICANT CHANGE UP (ref 3.5–5)
POTASSIUM SERPL-SCNC: 4 MMOL/L — SIGNIFICANT CHANGE UP (ref 3.5–5)
PROT SERPL-MCNC: 6.9 G/DL — SIGNIFICANT CHANGE UP (ref 6–8)
RBC # BLD: 4.18 M/UL — LOW (ref 4.2–5.4)
RBC # FLD: 12.4 % — SIGNIFICANT CHANGE UP (ref 11.5–14.5)
SODIUM SERPL-SCNC: 143 MMOL/L — SIGNIFICANT CHANGE UP (ref 135–146)
WBC # BLD: 5.99 K/UL — SIGNIFICANT CHANGE UP (ref 4.8–10.8)
WBC # FLD AUTO: 5.99 K/UL — SIGNIFICANT CHANGE UP (ref 4.8–10.8)

## 2022-02-19 PROCEDURE — 99232 SBSQ HOSP IP/OBS MODERATE 35: CPT

## 2022-02-19 PROCEDURE — 93010 ELECTROCARDIOGRAM REPORT: CPT

## 2022-02-19 RX ORDER — ATENOLOL 25 MG/1
25 TABLET ORAL ONCE
Refills: 0 | Status: COMPLETED | OUTPATIENT
Start: 2022-02-19 | End: 2022-02-19

## 2022-02-19 RX ORDER — ALPRAZOLAM 0.25 MG
0.25 TABLET ORAL AT BEDTIME
Refills: 0 | Status: DISCONTINUED | OUTPATIENT
Start: 2022-02-19 | End: 2022-02-20

## 2022-02-19 RX ORDER — ATENOLOL 25 MG/1
50 TABLET ORAL DAILY
Refills: 0 | Status: DISCONTINUED | OUTPATIENT
Start: 2022-02-20 | End: 2022-02-20

## 2022-02-19 RX ORDER — ATENOLOL 25 MG/1
25 TABLET ORAL DAILY
Refills: 0 | Status: DISCONTINUED | OUTPATIENT
Start: 2022-02-19 | End: 2022-02-19

## 2022-02-19 RX ADMIN — ATENOLOL 25 MILLIGRAM(S): 25 TABLET ORAL at 16:19

## 2022-02-19 RX ADMIN — AMLODIPINE BESYLATE 10 MILLIGRAM(S): 2.5 TABLET ORAL at 05:26

## 2022-02-19 RX ADMIN — ATORVASTATIN CALCIUM 40 MILLIGRAM(S): 80 TABLET, FILM COATED ORAL at 21:23

## 2022-02-19 RX ADMIN — Medication 81 MILLIGRAM(S): at 12:34

## 2022-02-19 RX ADMIN — CHLORHEXIDINE GLUCONATE 1 APPLICATION(S): 213 SOLUTION TOPICAL at 05:28

## 2022-02-19 RX ADMIN — VALSARTAN 320 MILLIGRAM(S): 80 TABLET ORAL at 21:23

## 2022-02-19 RX ADMIN — ATENOLOL 25 MILLIGRAM(S): 25 TABLET ORAL at 10:23

## 2022-02-19 RX ADMIN — Medication 0.25 MILLIGRAM(S): at 21:24

## 2022-02-19 NOTE — PROGRESS NOTE ADULT - SUBJECTIVE AND OBJECTIVE BOX
CHANI SCHMID, 61y, Female; MRN# [id number]  Hospital Stay: 2d.    Patient is a 61y old Female who presents with a chief complaint of Chest discomfort (17 Feb 2022 06:35)  Currently admitted to the ICU with the primary diagnosis of Hypertension    Hypertensive urgency      SUBJECTIVE:  Interval/Overnight events: Overnight, pt's pressure was still elevated to the 190s systolic, given 0.1 of clonidine.     REVIEW OF SYSTEMS:  As per HPI    OBJECTIVE:  VITALS:  ICU Vital Signs Last 24 Hrs  T(C): 37.1 (19 Feb 2022 04:00), Max: 37.1 (19 Feb 2022 04:00)  T(F): 98.8 (19 Feb 2022 04:00), Max: 98.8 (19 Feb 2022 04:00)  HR: 74 (19 Feb 2022 06:00) (59 - 99)  BP: 186/85 (19 Feb 2022 06:00) (107/53 - 205/93)  BP(mean): 122 (19 Feb 2022 06:00) (76 - 143)  ABP: --  ABP(mean): --  RR: 18 (18 Feb 2022 18:00) (18 - 19)  SpO2: 99% (18 Feb 2022 18:00) (98% - 99%)      I&Os:    18 Feb 2022 07:01  -  19 Feb 2022 07:00  --------------------------------------------------------  IN: 340 mL / OUT: 580 mL / NET: -240 mL      PHYSICAL EXAM:  CONST: well appearing for age  NECK:  supple  CARDIAC:  regular rate and rhythm, normal S1 and S2  RESP:  respiratory rate and effort appear normal for age; lungs are clear to auscultation bilaterally  ABDOMEN:  soft, nontender  MUSCULOSKELETAL/NEURO:  normal movement, normal tone  SKIN:  well-perfused; warm and dry    ACTIVE MEDICATIONS:  MEDICATIONS  (STANDING):  amLODIPine   Tablet 10 milliGRAM(s) Oral daily  aspirin  chewable 81 milliGRAM(s) Oral daily  atorvastatin 40 milliGRAM(s) Oral at bedtime  chlorhexidine 4% Liquid 1 Application(s) Topical <User Schedule>  influenza   Vaccine 0.5 milliLiter(s) IntraMuscular once  senna 2 Tablet(s) Oral at bedtime  valsartan 320 milliGRAM(s) Oral at bedtime    MEDICATIONS  (PRN):      LABS:                        11.3   5.99  )-----------( 277      ( 19 Feb 2022 04:59 )             36.3     02-19    143  |  106  |  15  ----------------------------<  121<H>  4.0   |  26  |  0.8    Ca    9.5      19 Feb 2022 04:59  Mg     2.1     02-19    TPro  6.9  /  Alb  4.3  /  TBili  0.8  /  DBili  x   /  AST  18  /  ALT  15  /  AlkPhos  96  02-19        IMAGING:  Xray Chest 2 Views PA/Lat (02.16.22 @ 14:48)   Impression:  No radiographic evidence of acute cardiopulmonary disease.      ECHO:  < from: TTE Echo Complete w/o Contrast w/ Doppler (02.17.22 @ 14:28) >  Summary:   1. LV Ejection Fraction by Kuhn's Method with a biplane EF of 55 %.   2. Mild concentric left ventricular hypertrophy.   3. Spectral Doppler shows impaired relaxation pattern of left   ventricular myocardial filling (Grade I diastolic dysfunction).   4. Normal left atrial size.   5. Normal right atrial size.   6. No evidence of mitral valve regurgitation.

## 2022-02-20 ENCOUNTER — TRANSCRIPTION ENCOUNTER (OUTPATIENT)
Age: 62
End: 2022-02-20

## 2022-02-20 VITALS
SYSTOLIC BLOOD PRESSURE: 145 MMHG | DIASTOLIC BLOOD PRESSURE: 67 MMHG | HEART RATE: 66 BPM | TEMPERATURE: 97 F | RESPIRATION RATE: 15 BRPM

## 2022-02-20 LAB
ALBUMIN SERPL ELPH-MCNC: 4.2 G/DL — SIGNIFICANT CHANGE UP (ref 3.5–5.2)
ALP SERPL-CCNC: 98 U/L — SIGNIFICANT CHANGE UP (ref 30–115)
ALT FLD-CCNC: 17 U/L — SIGNIFICANT CHANGE UP (ref 0–41)
ANION GAP SERPL CALC-SCNC: 9 MMOL/L — SIGNIFICANT CHANGE UP (ref 7–14)
AST SERPL-CCNC: 20 U/L — SIGNIFICANT CHANGE UP (ref 0–41)
BASOPHILS # BLD AUTO: 0.04 K/UL — SIGNIFICANT CHANGE UP (ref 0–0.2)
BASOPHILS NFR BLD AUTO: 0.6 % — SIGNIFICANT CHANGE UP (ref 0–1)
BILIRUB SERPL-MCNC: 0.9 MG/DL — SIGNIFICANT CHANGE UP (ref 0.2–1.2)
BUN SERPL-MCNC: 11 MG/DL — SIGNIFICANT CHANGE UP (ref 10–20)
CALCIUM SERPL-MCNC: 9.5 MG/DL — SIGNIFICANT CHANGE UP (ref 8.5–10.1)
CHLORIDE SERPL-SCNC: 106 MMOL/L — SIGNIFICANT CHANGE UP (ref 98–110)
CO2 SERPL-SCNC: 26 MMOL/L — SIGNIFICANT CHANGE UP (ref 17–32)
CREAT SERPL-MCNC: 0.7 MG/DL — SIGNIFICANT CHANGE UP (ref 0.7–1.5)
EOSINOPHIL # BLD AUTO: 0.18 K/UL — SIGNIFICANT CHANGE UP (ref 0–0.7)
EOSINOPHIL NFR BLD AUTO: 2.7 % — SIGNIFICANT CHANGE UP (ref 0–8)
GLUCOSE SERPL-MCNC: 98 MG/DL — SIGNIFICANT CHANGE UP (ref 70–99)
HCT VFR BLD CALC: 36.4 % — LOW (ref 37–47)
HGB BLD-MCNC: 11.4 G/DL — LOW (ref 12–16)
IMM GRANULOCYTES NFR BLD AUTO: 0.3 % — SIGNIFICANT CHANGE UP (ref 0.1–0.3)
LYMPHOCYTES # BLD AUTO: 2.15 K/UL — SIGNIFICANT CHANGE UP (ref 1.2–3.4)
LYMPHOCYTES # BLD AUTO: 32.6 % — SIGNIFICANT CHANGE UP (ref 20.5–51.1)
MAGNESIUM SERPL-MCNC: 2.1 MG/DL — SIGNIFICANT CHANGE UP (ref 1.8–2.4)
MCHC RBC-ENTMCNC: 27.3 PG — SIGNIFICANT CHANGE UP (ref 27–31)
MCHC RBC-ENTMCNC: 31.3 G/DL — LOW (ref 32–37)
MCV RBC AUTO: 87.3 FL — SIGNIFICANT CHANGE UP (ref 81–99)
MONOCYTES # BLD AUTO: 0.62 K/UL — HIGH (ref 0.1–0.6)
MONOCYTES NFR BLD AUTO: 9.4 % — HIGH (ref 1.7–9.3)
NEUTROPHILS # BLD AUTO: 3.58 K/UL — SIGNIFICANT CHANGE UP (ref 1.4–6.5)
NEUTROPHILS NFR BLD AUTO: 54.4 % — SIGNIFICANT CHANGE UP (ref 42.2–75.2)
NRBC # BLD: 0 /100 WBCS — SIGNIFICANT CHANGE UP (ref 0–0)
PLATELET # BLD AUTO: 276 K/UL — SIGNIFICANT CHANGE UP (ref 130–400)
POTASSIUM SERPL-MCNC: 3.9 MMOL/L — SIGNIFICANT CHANGE UP (ref 3.5–5)
POTASSIUM SERPL-SCNC: 3.9 MMOL/L — SIGNIFICANT CHANGE UP (ref 3.5–5)
PROT SERPL-MCNC: 6.8 G/DL — SIGNIFICANT CHANGE UP (ref 6–8)
RBC # BLD: 4.17 M/UL — LOW (ref 4.2–5.4)
RBC # FLD: 12.3 % — SIGNIFICANT CHANGE UP (ref 11.5–14.5)
SODIUM SERPL-SCNC: 141 MMOL/L — SIGNIFICANT CHANGE UP (ref 135–146)
WBC # BLD: 6.59 K/UL — SIGNIFICANT CHANGE UP (ref 4.8–10.8)
WBC # FLD AUTO: 6.59 K/UL — SIGNIFICANT CHANGE UP (ref 4.8–10.8)

## 2022-02-20 PROCEDURE — 99233 SBSQ HOSP IP/OBS HIGH 50: CPT

## 2022-02-20 RX ORDER — AMLODIPINE BESYLATE 2.5 MG/1
1 TABLET ORAL
Qty: 30 | Refills: 0
Start: 2022-02-20 | End: 2022-03-21

## 2022-02-20 RX ORDER — VALSARTAN 80 MG/1
1 TABLET ORAL
Qty: 0 | Refills: 0 | DISCHARGE

## 2022-02-20 RX ORDER — POTASSIUM CHLORIDE 20 MEQ
20 PACKET (EA) ORAL ONCE
Refills: 0 | Status: COMPLETED | OUTPATIENT
Start: 2022-02-20 | End: 2022-02-20

## 2022-02-20 RX ORDER — VALSARTAN 80 MG/1
1 TABLET ORAL
Qty: 30 | Refills: 0
Start: 2022-02-20 | End: 2022-03-21

## 2022-02-20 RX ORDER — ATENOLOL 25 MG/1
1 TABLET ORAL
Qty: 30 | Refills: 0
Start: 2022-02-20 | End: 2022-03-21

## 2022-02-20 RX ORDER — ATORVASTATIN CALCIUM 80 MG/1
1 TABLET, FILM COATED ORAL
Qty: 30 | Refills: 0
Start: 2022-02-20 | End: 2022-03-21

## 2022-02-20 RX ADMIN — Medication 81 MILLIGRAM(S): at 12:12

## 2022-02-20 RX ADMIN — CHLORHEXIDINE GLUCONATE 1 APPLICATION(S): 213 SOLUTION TOPICAL at 05:29

## 2022-02-20 RX ADMIN — ATENOLOL 50 MILLIGRAM(S): 25 TABLET ORAL at 05:29

## 2022-02-20 RX ADMIN — Medication 20 MILLIEQUIVALENT(S): at 08:55

## 2022-02-20 RX ADMIN — AMLODIPINE BESYLATE 10 MILLIGRAM(S): 2.5 TABLET ORAL at 05:28

## 2022-02-20 NOTE — DISCHARGE NOTE NURSING/CASE MANAGEMENT/SOCIAL WORK - NSDCPEFALRISK_GEN_ALL_CORE
For information on Fall & Injury Prevention, visit: [email or web address] OR  [email or web address] OR  https://www.cdc.gov/steadi/patient.html

## 2022-02-20 NOTE — PROGRESS NOTE ADULT - SUBJECTIVE AND OBJECTIVE BOX
CHANI SCHMID, 61y, Female; MRN# [id number]  Hospital Stay: 4d.    Patient is a 61y old Female who presents with a chief complaint of Chest discomfort (19 Feb 2022 07:31)  Currently admitted to the ICU with the primary diagnosis of Hypertensive emergency      SUBJECTIVE:  Interval/Overnight events: No overnight events.    REVIEW OF SYSTEMS:  As per HPI  OBJECTIVE:  VITALS:  T(F): 98.8 (02-19-22 @ 20:00), Max: 98.8 (02-19-22 @ 04:00)  HR: 68 (02-19-22 @ 20:00) (59 - 108)  BP: 184/87 (02-19-22 @ 20:00) (119/58 - 213/105)  ABP: --  ABP(mean): --  RR: 15 (02-19-22 @ 16:00) (15 - 16)  SpO2: 97% (02-19-22 @ 16:00) (97% - 98%)    I&Os:    02-18-22 @ 07:01  -  02-19-22 @ 07:00  --------------------------------------------------------  IN: 660 mL / OUT: 1080 mL / NET: -420 mL    02-19-22 @ 07:01  -  02-20-22 @ 00:35  --------------------------------------------------------  IN: 960 mL / OUT: 0 mL / NET: 960 mL      PHYSICAL EXAM:  CONST: well appearing for age  CARDIAC:  regular rate and rhythm, normal S1 and S2  RESP:  respiratory rate and effort appear normal for age; lungs are clear to auscultation bilaterally  ABDOMEN:  soft, nontender  MUSCULOSKELETAL/NEURO:  normal movement, normal tone  SKIN:  well-perfused; warm and dry    ACTIVE MEDICATIONS:  Standing  ALPRAZolam 0.25 milliGRAM(s) Oral at bedtime  amLODIPine   Tablet 10 milliGRAM(s) Oral daily  aspirin  chewable 81 milliGRAM(s) Oral daily  ATENolol  Tablet 50 milliGRAM(s) Oral daily  atorvastatin 40 milliGRAM(s) Oral at bedtime  chlorhexidine 4% Liquid 1 Application(s) Topical <User Schedule>  influenza   Vaccine 0.5 milliLiter(s) IntraMuscular once  senna 2 Tablet(s) Oral at bedtime  valsartan 320 milliGRAM(s) Oral at bedtime    PRN    LABS:  02-19-22 @ 04:59  WBC 5.99, H/H 11.3<L>/36.3<L>, plt 277  Na 143, K 4.0, Cl 106, CO2 26, BUN 15, Cr 0.8, Glu 121<H>    Ca 9.5, Mg 2.1, Phosphorus --    Tot Protein 6.9, Alb 4.3, T. Bili 0.8, D. Bili --  AST 18, ALT 15, Alk phos 96      02-16-22 @ 23:30:  Troponin T <0.01  02-16-22 @ 13:55:  Troponin T <0.01    02-16-22 @ 17:25:  COVID-19 PCR: NotDetec      IMAGING:  < from: Xray Chest 2 Views PA/Lat (02.16.22 @ 14:48) >  Impression:    No radiographic evidence of acute cardiopulmonary disease.    < end of copied text >      ECHO:  < from: TTE Echo Complete w/o Contrast w/ Doppler (02.17.22 @ 14:28) >    Summary:   1. LV Ejection Fraction by Kuhn's Method with a biplane EF of 55 %.   2. Mild concentric left ventricular hypertrophy.   3. Spectral Doppler shows impaired relaxation pattern of left   ventricular myocardial filling (Grade I diastolic dysfunction).   4. Normal left atrial size.   5. Normal right atrial size.   6. No evidence of mitral valve regurgitation.    < end of copied text >

## 2022-02-20 NOTE — DISCHARGE NOTE NURSING/CASE MANAGEMENT/SOCIAL WORK - PATIENT PORTAL LINK FT
You can access the FollowMyHealth Patient Portal offered by [facility] by registering at the following website: [email or web address]. By joining [facility] FollowMyHealth portal, you will also be able to view your health information using other applications (apps) compatible with our system.

## 2022-02-20 NOTE — PROGRESS NOTE ADULT - ASSESSMENT
IMPRESSION  Hypertensive emergency (chest discomfort), resolved    PLAN:    CNS:   -no depressants.     HEENT:   -Oral care    PULMONARY:    -HOB @ 45 degrees    CARDIOVASCULAR:   - Currently on valsartan, amlodipine for bp control.  - TTE: EF 55%, G1DD    GI:   -GI prophylaxis.    -oral feeds    RENAL:    -Follow up lytes.  Correct as needed.     INFECTIOUS DISEASE:   -CHG 4% daily bath    HEMATOLOGICAL:    -DVT prophylaxis.    ENDOCRINE:    -Follow up FS.  -insulin sliding scale if needed    MUSCULOSKELETAL:   -increase ambulation as tolerated    d/c home tomorrow if bp OK   IMPRESSION  Hypertensive emergency (chest discomfort), resolved    PLAN:    CNS:   -no depressants.     HEENT:   -Oral care    PULMONARY:    -HOB @ 45 degrees    CARDIOVASCULAR:   - Currently on valsartan, atenoolol amlodipine for bp control.  - TTE: EF 55%, G1DD    GI:   -GI prophylaxis.    -oral feeds    RENAL:    -Follow up lytes.  Correct as needed.     INFECTIOUS DISEASE:   -CHG 4% daily bath    HEMATOLOGICAL:    -DVT prophylaxis.    ENDOCRINE:    -Follow up FS.  -insulin sliding scale if needed    MUSCULOSKELETAL:   -increase ambulation as tolerated    d/c home today

## 2022-02-20 NOTE — PROGRESS NOTE ADULT - ATTENDING COMMENTS
Agree with above  HTN emergency, non compliant with medication, po medicine resume it, BP better controlled  Patient feels comfortable    2/18 patient is stable, asymptomatic, however BP still labile, increase overnight and received extra dose of valsartan and clonidine, SBP went down to 90's.   will increase valsartan to 320 mg daily. will keep monitor  Plan to dc home in 24 -48 hrs if stable. Echo reviewed normal EF , mild LVH.    2/19 Patient is stable, BP still labile, in morning SBP was 195 with mild chest discomfort, patient looks anxious, EKG normal sinus rhythm,   BB is very labile varies between 100 to 190 mmHg , high BP correlate with  anxiety. will add atenolol and start xanax at bedtime.   continue in hosp monitor for now  plan to dc home in 24-48hrs.
Agree with above  HTN emergency, non compliant with medication, po medicine resume it, BP better controlled  Patient feels comfortable    2/18 patient is stable, asymptomatic, however BP still labile, increase overnight and received extra dose of valsartan and clonidine, SBP went down to 90's.   will increase valsartan to 320 mg daily. will keep monitor  Plan to dc home in 24 -48 hrs if stable. Echo reviewed normal EF , mild LVH.    2/19 Patient is stable, BP still labile, in morning SBP was 195 with mild chest discomfort, patient looks anxious, EKG normal sinus rhythm,   BB is very labile varies between 100 to 190 mmHg , high BP correlate with  anxiety. will add atenolol and start xanax at bedtime.   continue in hosp monitor for now  plan to dc home in 24-48hrs.    2/20 patient is stable, feels better, BP better controlled,   Plan to dc home on valsartan 320 mg daily, amlodipine 10 mg daily, and atenolol 50 mg daily  continue asa statin   follow up with Dr Sotelo in 2 weeks.
Agree with above  HTN emergency, non compliant with medication, po medicine resume it, BP better controlled  Patient feels comfortable    2/18 patient is stable, asymptomatic, however BP still labile, increase overnight and received extra dose of valsartan and clonidine, SBP went down to 90's.   will increase valsartan to 320 mg daily. will keep monitor  Plan to dc home in 24 -48 hrs if stable. Echo reviewed normal EF , mild LVH.
Agree with above  HTN emergency, non compliant with medication, po medicine resume it, BP better controlled  Patient feels comfortable  Plan to dc home in 24 hrs if stable.

## 2022-02-25 DIAGNOSIS — R51.9 HEADACHE, UNSPECIFIED: ICD-10-CM

## 2022-02-25 DIAGNOSIS — Z91.14 PATIENT'S OTHER NONCOMPLIANCE WITH MEDICATION REGIMEN: ICD-10-CM

## 2022-02-25 DIAGNOSIS — I16.1 HYPERTENSIVE EMERGENCY: ICD-10-CM

## 2022-02-25 DIAGNOSIS — R07.9 CHEST PAIN, UNSPECIFIED: ICD-10-CM

## 2022-02-25 DIAGNOSIS — F41.9 ANXIETY DISORDER, UNSPECIFIED: ICD-10-CM

## 2022-02-25 DIAGNOSIS — I51.7 CARDIOMEGALY: ICD-10-CM

## 2022-02-25 DIAGNOSIS — R00.2 PALPITATIONS: ICD-10-CM

## 2022-03-26 RX ORDER — LOSARTAN POTASSIUM AND HYDROCHLOROTHIAZIDE 25; 100 MG/1; MG/1
100-25 TABLET ORAL
Qty: 30 | Refills: 0 | Status: DISCONTINUED | COMMUNITY
Start: 2021-06-11 | End: 2022-03-26

## 2022-03-26 RX ORDER — VALSARTAN 160 MG/1
160 TABLET, COATED ORAL DAILY
Qty: 90 | Refills: 1 | Status: DISCONTINUED | COMMUNITY
Start: 2021-07-12 | End: 2022-03-26

## 2022-04-15 ENCOUNTER — APPOINTMENT (OUTPATIENT)
Dept: CARDIOLOGY | Facility: CLINIC | Age: 62
End: 2022-04-15
Payer: COMMERCIAL

## 2022-04-15 VITALS
HEIGHT: 65 IN | WEIGHT: 146 LBS | SYSTOLIC BLOOD PRESSURE: 140 MMHG | HEART RATE: 63 BPM | BODY MASS INDEX: 24.32 KG/M2 | DIASTOLIC BLOOD PRESSURE: 80 MMHG | TEMPERATURE: 96.4 F

## 2022-04-15 PROCEDURE — 99214 OFFICE O/P EST MOD 30 MIN: CPT

## 2022-04-15 PROCEDURE — 93000 ELECTROCARDIOGRAM COMPLETE: CPT

## 2022-04-17 NOTE — PHYSICAL EXAM
[de-identified] : well appearing, mildly overweight, no distress [de-identified] : CTA [de-identified] : reg. nL s1/s2, no m/r/g [de-identified] : alert, normal affect, logical conversation

## 2022-04-17 NOTE — DISCUSSION/SUMMARY
[FreeTextEntry1] : Cont ASA\par Cont Lipitor\par Cont Norvasc, Valsartan, and Atenolol.\par PMD follow-up.\par Follow-up 6-months. Yes

## 2022-04-17 NOTE — ASSESSMENT
[FreeTextEntry1] : Mild CAD.\par \par BP controlled.\par \par ECHO (2/17/22): nL LVSF.  Mild LVH.\par CCTA (6/11/21): Mild mid LAD / CX disease. Ca = (5).

## 2022-04-17 NOTE — REASON FOR VISIT
[FreeTextEntry1] : Interval hospitalization.  Hypertensive emergency.  Ucomplicated course.\par \par Feels well since discharge.\par \par No chest pain.  Breathing comfortable.\par \par Tolerating Rx.\par \par ECHO (2/17/22): nL LVSF.  Mild LVH.

## 2022-05-24 ENCOUNTER — RX RENEWAL (OUTPATIENT)
Age: 62
End: 2022-05-24

## 2022-10-14 ENCOUNTER — APPOINTMENT (OUTPATIENT)
Dept: CARDIOLOGY | Facility: CLINIC | Age: 62
End: 2022-10-14

## 2022-10-14 VITALS
WEIGHT: 147 LBS | BODY MASS INDEX: 24.49 KG/M2 | HEART RATE: 62 BPM | HEIGHT: 65 IN | SYSTOLIC BLOOD PRESSURE: 144 MMHG | DIASTOLIC BLOOD PRESSURE: 86 MMHG

## 2022-10-14 PROCEDURE — 93000 ELECTROCARDIOGRAM COMPLETE: CPT

## 2022-10-14 PROCEDURE — 99214 OFFICE O/P EST MOD 30 MIN: CPT | Mod: 25

## 2022-10-14 NOTE — DISCUSSION/SUMMARY
[FreeTextEntry1] : Cont ASA\par Cont Lipitor\par Cont Norvasc, Valsartan, and Atenolol.\par Exercise / weight loss\par Monitor home BP.\par PMD follow-up\par Follow-up 4-months

## 2022-10-14 NOTE — REASON FOR VISIT
[FreeTextEntry1] : Feels well.\par \par No chest pain.  Breathing comfortable.\par \par Tolerating Rx.\par \par Not checking home BP.\par \par Established PMD (Dr. Moore).  Just had labs.

## 2022-10-14 NOTE — PHYSICAL EXAM
[de-identified] : well appearing, mildly overweight, no distress [de-identified] : reg. nL s1/s2, no m/r/g [de-identified] : CTA [de-identified] : alert, normal affect, logical conversation

## 2022-10-14 NOTE — ASSESSMENT
[FreeTextEntry1] : Mild CAD.\par \par Borderline BP control.\par Initial mildly elevated.  Repeat 150/100 (got nervous)\par HCTZ intolerance.\par \par ECHO (2/17/22): nL LVSF.  Mild LVH.\par CCTA (6/11/21): Mild mid LAD / CX disease. Ca = (5).

## 2023-02-10 ENCOUNTER — APPOINTMENT (OUTPATIENT)
Dept: CARDIOLOGY | Facility: CLINIC | Age: 63
End: 2023-02-10
Payer: COMMERCIAL

## 2023-02-10 VITALS
HEIGHT: 65 IN | SYSTOLIC BLOOD PRESSURE: 140 MMHG | TEMPERATURE: 96.7 F | WEIGHT: 139 LBS | BODY MASS INDEX: 23.16 KG/M2 | DIASTOLIC BLOOD PRESSURE: 90 MMHG | HEART RATE: 64 BPM

## 2023-02-10 PROCEDURE — 99214 OFFICE O/P EST MOD 30 MIN: CPT | Mod: 25

## 2023-02-10 PROCEDURE — 93000 ELECTROCARDIOGRAM COMPLETE: CPT

## 2023-02-10 NOTE — DISCUSSION/SUMMARY
[FreeTextEntry1] : Cont ASA\par Cont Lipitor\par Cont Norvasc, Valsartan, and Atenolol.\par Exercise / weight loss\par Monitor BP.\par PMD follow-up / labs.\par Follow-up 1-y / as needed (offset 6-months with PMD)

## 2023-02-10 NOTE — PHYSICAL EXAM
[de-identified] : well appearing, mildly overweight, no distress [de-identified] : reg. nL s1/s2, no m/r/g [de-identified] : CTA [de-identified] : alert, normal affect, logical conversation

## 2023-02-10 NOTE — REASON FOR VISIT
[FreeTextEntry1] : Feels well.\par \par Exercises 2-5 times / week.  30-min stationary bike / skips rope / calisthenics.  No exertional symptoms.\par \par Lost 8-lbs.\par \par No chest pain.  Breathing comfortable.\par \par Tolerating Rx.\par \par Not checking home BP.

## 2023-02-10 NOTE — ASSESSMENT
[FreeTextEntry1] : Mild CAD.\par \par BP controlled (repeat 136/86)\par HCTZ intolerance.\par \par ECHO (2/17/22): nL LVSF.  Mild LVH.\par CCTA (6/11/21): Mild mid LAD / CX disease. Ca = (5).

## 2023-11-30 RX ORDER — ATENOLOL 50 MG/1
50 TABLET ORAL DAILY
Qty: 90 | Refills: 2 | Status: ACTIVE | COMMUNITY
Start: 2022-03-23 | End: 1900-01-01

## 2023-11-30 RX ORDER — VALSARTAN 320 MG/1
320 TABLET, COATED ORAL
Qty: 30 | Refills: 5 | Status: ACTIVE | COMMUNITY
Start: 2022-03-23 | End: 1900-01-01

## 2023-12-07 RX ORDER — AMLODIPINE BESYLATE 10 MG/1
10 TABLET ORAL
Qty: 90 | Refills: 2 | Status: ACTIVE | COMMUNITY
Start: 2021-06-11 | End: 1900-01-01

## 2024-02-09 ENCOUNTER — APPOINTMENT (OUTPATIENT)
Dept: CARDIOLOGY | Facility: CLINIC | Age: 64
End: 2024-02-09
Payer: COMMERCIAL

## 2024-02-09 VITALS
DIASTOLIC BLOOD PRESSURE: 90 MMHG | SYSTOLIC BLOOD PRESSURE: 142 MMHG | WEIGHT: 137 LBS | BODY MASS INDEX: 22.82 KG/M2 | HEIGHT: 65 IN | HEART RATE: 66 BPM

## 2024-02-09 DIAGNOSIS — I25.10 ATHEROSCLEROTIC HEART DISEASE OF NATIVE CORONARY ARTERY W/OUT ANGINA PECTORIS: ICD-10-CM

## 2024-02-09 DIAGNOSIS — Z00.00 ENCOUNTER FOR GENERAL ADULT MEDICAL EXAMINATION W/OUT ABNORMAL FINDINGS: ICD-10-CM

## 2024-02-09 DIAGNOSIS — I10 ESSENTIAL (PRIMARY) HYPERTENSION: ICD-10-CM

## 2024-02-09 PROCEDURE — 93000 ELECTROCARDIOGRAM COMPLETE: CPT

## 2024-02-09 PROCEDURE — 99214 OFFICE O/P EST MOD 30 MIN: CPT | Mod: 25

## 2024-03-10 NOTE — ASSESSMENT
[FreeTextEntry1] : Mild CAD.  Borderline BP control. HCTZ intolerance.  Not excited about additional medication.  ECHO (2/17/22): nL LVSF.  Mild LVH. CCTA (6/11/21): Mild mid LAD / CX disease. Ca = (5).

## 2024-03-10 NOTE — PHYSICAL EXAM
[de-identified] : well appearing, mildly overweight, no distress [de-identified] : CTA [de-identified] : reg. nL s1/s2, no m/r/g [de-identified] : alert, normal affect, logical conversation

## 2024-03-10 NOTE — REASON FOR VISIT
[FreeTextEntry1] : Feels well.  Exercises, but not consistently.  Feels great when she is into routine.  No interval cardiac symptoms.  Breathing comfortable.  Weight stable.  Tolerating medications.  Checks home BP.  Generally 130s over 80s.  Labs reviewed (8/2023): , HDL 46, triglycerides 103 CBC, CMP, TSH unremarkable

## 2024-03-10 NOTE — DISCUSSION/SUMMARY
[EKG obtained to assist in diagnosis and management of assessed problem(s)] : EKG obtained to assist in diagnosis and management of assessed problem(s) [FreeTextEntry1] : Cont ASA Cont Lipitor Cont Norvasc, Valsartan, and Atenolol. Exercise / weight loss Monitor BP. PMD follow-up / labs. Follow-up 6-months

## 2024-08-09 ENCOUNTER — APPOINTMENT (OUTPATIENT)
Dept: CARDIOLOGY | Facility: CLINIC | Age: 64
End: 2024-08-09

## 2024-08-09 PROCEDURE — 93000 ELECTROCARDIOGRAM COMPLETE: CPT

## 2024-08-09 PROCEDURE — 99213 OFFICE O/P EST LOW 20 MIN: CPT | Mod: 25

## 2024-08-09 NOTE — REASON FOR VISIT
[FreeTextEntry1] : Feels well.  Exercise without exertional symptoms.  Watching what she eats.  Lost another 4 pounds.  Tolerating medications.  Anxious about BP read and staring at machine when I entered the room.  Not checking at home.

## 2024-08-09 NOTE — PHYSICAL EXAM
[de-identified] : well appearing, mildly overweight, no distress [de-identified] : alert, normal affect, logical conversation

## 2024-08-09 NOTE — ASSESSMENT
[FreeTextEntry1] : Mild CAD.  BP elevated today.  Overall, borderline control. HCTZ intolerance.  Not excited about additional medication.  ECHO (2/17/22): nL LVSF.  Mild LVH. CCTA (6/11/21): Mild mid LAD / CX disease. Ca = (5).

## 2024-08-09 NOTE — DISCUSSION/SUMMARY
[EKG obtained to assist in diagnosis and management of assessed problem(s)] : EKG obtained to assist in diagnosis and management of assessed problem(s) [FreeTextEntry1] : Cont ASA Cont Lipitor Cont Norvasc, Valsartan, and Atenolol. Exercise / weight loss Monitor BP. PMD follow-up / labs. Follow-up 6-months  Spoke about changing amlodipine to nifedipine today.  Estelle is adamant about not changing medication or adding additional at this time.

## 2025-02-21 ENCOUNTER — APPOINTMENT (OUTPATIENT)
Dept: CARDIOLOGY | Facility: CLINIC | Age: 65
End: 2025-02-21